# Patient Record
Sex: FEMALE | URBAN - METROPOLITAN AREA
[De-identification: names, ages, dates, MRNs, and addresses within clinical notes are randomized per-mention and may not be internally consistent; named-entity substitution may affect disease eponyms.]

---

## 2023-10-11 ENCOUNTER — OFFICE VISIT (OUTPATIENT)
Age: 58
End: 2023-10-11
Payer: COMMERCIAL

## 2023-10-11 VITALS
BODY MASS INDEX: 31.5 KG/M2 | HEIGHT: 63 IN | SYSTOLIC BLOOD PRESSURE: 158 MMHG | DIASTOLIC BLOOD PRESSURE: 99 MMHG | HEART RATE: 71 BPM | RESPIRATION RATE: 16 BRPM | OXYGEN SATURATION: 100 % | TEMPERATURE: 97.3 F | WEIGHT: 177.8 LBS

## 2023-10-11 DIAGNOSIS — K90.9 INTESTINAL MALABSORPTION, UNSPECIFIED TYPE: ICD-10-CM

## 2023-10-11 DIAGNOSIS — E66.09 CLASS 1 OBESITY DUE TO EXCESS CALORIES WITHOUT SERIOUS COMORBIDITY IN ADULT, UNSPECIFIED BMI: Primary | ICD-10-CM

## 2023-10-11 DIAGNOSIS — Z86.79 HISTORY OF WOLFF-PARKINSON-WHITE (WPW) SYNDROME: ICD-10-CM

## 2023-10-11 DIAGNOSIS — F41.9 ANXIETY: ICD-10-CM

## 2023-10-11 DIAGNOSIS — K21.9 GASTROESOPHAGEAL REFLUX DISEASE, UNSPECIFIED WHETHER ESOPHAGITIS PRESENT: ICD-10-CM

## 2023-10-11 DIAGNOSIS — Z87.891 SMOKING HISTORY: ICD-10-CM

## 2023-10-11 DIAGNOSIS — Z90.3 HISTORY OF SLEEVE GASTRECTOMY: ICD-10-CM

## 2023-10-11 DIAGNOSIS — K57.90 DIVERTICULOSIS: ICD-10-CM

## 2023-10-11 PROBLEM — E66.9 OBESITY: Status: ACTIVE | Noted: 2023-10-11

## 2023-10-11 PROCEDURE — 99205 OFFICE O/P NEW HI 60 MIN: CPT | Performed by: SPECIALIST

## 2023-10-11 RX ORDER — HYDROCODONE BITARTRATE AND ACETAMINOPHEN 5; 325 MG/1; MG/1
TABLET ORAL
COMMUNITY
Start: 2023-09-09 | End: 2023-10-11 | Stop reason: ALTCHOICE

## 2023-10-11 RX ORDER — CYCLOBENZAPRINE HCL 10 MG
10 TABLET ORAL
COMMUNITY
Start: 2023-09-20

## 2023-10-11 RX ORDER — DULOXETIN HYDROCHLORIDE 20 MG/1
20 CAPSULE, DELAYED RELEASE ORAL 2 TIMES DAILY
COMMUNITY
Start: 2023-10-06

## 2023-10-11 RX ORDER — VALACYCLOVIR HYDROCHLORIDE 500 MG/1
TABLET, FILM COATED ORAL
COMMUNITY
Start: 2023-07-27

## 2023-10-11 RX ORDER — HYDROCORTISONE 25 MG/G
CREAM TOPICAL
COMMUNITY
Start: 2023-10-08

## 2023-10-11 ASSESSMENT — PATIENT HEALTH QUESTIONNAIRE - PHQ9
SUM OF ALL RESPONSES TO PHQ QUESTIONS 1-9: 0
2. FEELING DOWN, DEPRESSED OR HOPELESS: 0
SUM OF ALL RESPONSES TO PHQ9 QUESTIONS 1 & 2: 0
1. LITTLE INTEREST OR PLEASURE IN DOING THINGS: 0
SUM OF ALL RESPONSES TO PHQ QUESTIONS 1-9: 0

## 2023-10-11 NOTE — PROGRESS NOTES
obtained to evaluate their post surgical anatomy. They understand that the majority of bariatric patients are not revision candidates due to appropriate post surgical anatomy that can not be improved upon. They understand also that if their initial procedure was performed via an open technique that this alone complicates their situation immensely. They understand, as I have explained today, that the adhesive disease associated with prior open procedures is at times a rate limiting factor. This precludes our ability to perform a revision procedure safely. The factors that contribute to this are increased risk such as age, health issues and increased risk from a procedural standpoint and have been discussed today. We will proceed with the UGI swallow study as described above. The patient understands all of the above and wishes to proceed with the study. 2.Nutrition-  I have discussed in detail the pitfalls in diet that have contributed to their weight regain and the importance of adhering to a lifelong regimen of dietary goals and proper eating habits. I have discussed the proper lifelong bariatric diet  in detail spending in excess of 20 minutes discussing this. We will schedule them for a dietary consultation with our nutritionist and urge them to continue on a regular follow-up schedule with her. 3.Maintenance vitamins- Today we have discussed the importance of vitamins as it pertains to their procedure and we will obtain appropriate lab to check all levels. They have been provided a handout regarding this today. Total time spent with the patient reviewing their complex history of bariatric surgery,diet, and plan is in excess of 60 minutes.       Secondary Diagnoses:         Signed By: SHALONDA Pedro     October 11, 2023

## 2023-10-18 ENCOUNTER — HOSPITAL ENCOUNTER (OUTPATIENT)
Facility: HOSPITAL | Age: 58
Discharge: HOME | End: 2023-10-21
Payer: COMMERCIAL

## 2023-10-18 ENCOUNTER — HOSPITAL ENCOUNTER (OUTPATIENT)
Facility: HOSPITAL | Age: 58
Setting detail: OUTPATIENT SURGERY
Discharge: HOME | End: 2023-10-21
Payer: COMMERCIAL

## 2023-10-18 VITALS
TEMPERATURE: 98.1 F | SYSTOLIC BLOOD PRESSURE: 137 MMHG | RESPIRATION RATE: 18 BRPM | DIASTOLIC BLOOD PRESSURE: 83 MMHG | HEIGHT: 63 IN | BODY MASS INDEX: 31.27 KG/M2 | HEART RATE: 65 BPM | WEIGHT: 176.5 LBS | OXYGEN SATURATION: 100 %

## 2023-10-18 DIAGNOSIS — E66.01 MORBID OBESITY (HCC): ICD-10-CM

## 2023-10-18 PROCEDURE — 2500000003 HC RX 250 WO HCPCS: Performed by: SPECIALIST

## 2023-10-18 PROCEDURE — 74240 X-RAY XM UPR GI TRC 1CNTRST: CPT

## 2023-10-18 PROCEDURE — 74240 X-RAY XM UPR GI TRC 1CNTRST: CPT | Performed by: SPECIALIST

## 2023-10-18 PROCEDURE — 74220 X-RAY XM ESOPHAGUS 1CNTRST: CPT

## 2023-10-18 RX ADMIN — BARIUM SULFATE 100 ML: 960 POWDER, FOR SUSPENSION ORAL at 13:54

## 2023-10-18 NOTE — PROCEDURES
[facility]    Upper GI Procedure Report      [facility]    Medical Record [id number]    1965    Date of Service - October 18, 2023    Pre-Op Diagnosis - patient is status post sleeve resection performed in [place] 9.5 years ago with complaint of weight regain and GERD. They now present for UGI to assess their post surgical anatomy. Post-Op Diagnosis -same    Procedure - UGI study with barium    Surgeon - Edouard Leon MD    Assistant - None    Complications - None    Specimens - None    Implants - None    Estimate Blood Loss - None    Statement of Medical Necessity - Need for radiologic evaluation prior to further management of their care. .    Procedure -the patient was brought to the fluoroscopy suite where they were given thin barium. On swallowing the barium the patient was noted to have normal peristalsis of their esophagus with progressive flow into the distal esophagus. Specific findings of the distal esophagus revealed that they did not have a hiatal hernia. Contrast flowed normally through the esophagus and into a relative properly sized sleeve stomach without reflux or obstruction but with a significant stricture at the proximal 1/4th aspect of the sleeve. The stomach filled in a timely manner and emptied into the duodenum without issue or hesitation. The anatomy was normal for the timeframe with no stricture or obstruction or any other abnormality. Given the findings of today's exam we will plan for EGD.     Edouard Leon MD

## 2023-10-18 NOTE — PROGRESS NOTES
THE [facility] UGI FOCUS NOTE      Date:  10/18/2023  Time:  1:50 PM    Patient:  Zari Cedeno  Procedure:  UGI      Patient Questions  Lap Band Adjustment Patient Questionnaire: If female, are you pregnant?  []Yes     [x]No  Tolerates thick liquids:  []Well   []1     []2     []3     []4     []5     [x]Poorly  Tolerates red meat:  []Well   []1     [x]2     []3     []4     []5     [] Poorly  Tolerates chicken:  []Well   []1     [x]2     []3     []4     []5     []Poorly  Tolerates fish:   []Well   []1     [x]2     []3     []4     []5     []Poorly  Hunger is:   []Well Controlled     []1     [x]2     []3     []4     []5      [] Poorly Controlled  Nightime regurgitation:  []Never     []1     []2     []3     []4     []5     [x]Frequently    Lap Band Info:  Band Type  []Realize     []Realize-C     []AP     []VG     []10cm     []Unknown  []Other      Comments:        Emily Maurer RN

## 2023-10-19 ENCOUNTER — TELEPHONE (OUTPATIENT)
Age: 58
End: 2023-10-19

## 2023-10-20 DIAGNOSIS — E53.9 VITAMIN B DEFICIENCY: ICD-10-CM

## 2023-10-20 DIAGNOSIS — K90.9 INTESTINAL MALABSORPTION, UNSPECIFIED TYPE: Primary | ICD-10-CM

## 2023-10-20 DIAGNOSIS — Z98.84 BARIATRIC SURGERY STATUS: ICD-10-CM

## 2023-11-02 ENCOUNTER — ANESTHESIA EVENT (OUTPATIENT)
Facility: HOSPITAL | Age: 58
End: 2023-11-02
Payer: COMMERCIAL

## 2023-11-02 ENCOUNTER — HOSPITAL ENCOUNTER (OUTPATIENT)
Facility: HOSPITAL | Age: 58
Discharge: HOME | End: 2023-11-02
Payer: COMMERCIAL

## 2023-11-02 DIAGNOSIS — Z98.84 BARIATRIC SURGERY STATUS: ICD-10-CM

## 2023-11-02 DIAGNOSIS — K90.9 INTESTINAL MALABSORPTION, UNSPECIFIED TYPE: ICD-10-CM

## 2023-11-02 LAB
ALBUMIN SERPL-MCNC: 3.7 G/DL (ref 3.4–5)
ALBUMIN/GLOB SERPL: 1.1 (ref 0.8–1.7)
ALP SERPL-CCNC: 103 U/L (ref 45–117)
ALT SERPL-CCNC: 29 U/L (ref 13–56)
AST SERPL-CCNC: 21 U/L (ref 10–38)
BILIRUB DIRECT SERPL-MCNC: <0.1 MG/DL (ref 0–0.2)
BILIRUB SERPL-MCNC: 0.4 MG/DL (ref 0.2–1)
GLOBULIN SER CALC-MCNC: 3.5 G/DL (ref 2–4)
PROT SERPL-MCNC: 7.2 G/DL (ref 6.4–8.2)

## 2023-11-02 PROCEDURE — 36415 COLL VENOUS BLD VENIPUNCTURE: CPT

## 2023-11-02 PROCEDURE — 80076 HEPATIC FUNCTION PANEL: CPT

## 2023-11-02 RX ORDER — DROPERIDOL 2.5 MG/ML
0.62 INJECTION, SOLUTION INTRAMUSCULAR; INTRAVENOUS
Status: CANCELLED | OUTPATIENT
Start: 2023-11-02 | End: 2023-11-03

## 2023-11-02 RX ORDER — ONDANSETRON 2 MG/ML
4 INJECTION INTRAMUSCULAR; INTRAVENOUS
Status: CANCELLED | OUTPATIENT
Start: 2023-11-02 | End: 2023-11-03

## 2023-11-02 RX ORDER — LABETALOL HYDROCHLORIDE 5 MG/ML
10 INJECTION, SOLUTION INTRAVENOUS
Status: CANCELLED | OUTPATIENT
Start: 2023-11-02

## 2023-11-02 RX ORDER — SODIUM CHLORIDE, SODIUM LACTATE, POTASSIUM CHLORIDE, CALCIUM CHLORIDE 600; 310; 30; 20 MG/100ML; MG/100ML; MG/100ML; MG/100ML
INJECTION, SOLUTION INTRAVENOUS CONTINUOUS
Status: CANCELLED | OUTPATIENT
Start: 2023-11-02

## 2023-11-02 RX ORDER — SODIUM CHLORIDE 0.9 % (FLUSH) 0.9 %
5-40 SYRINGE (ML) INJECTION EVERY 12 HOURS SCHEDULED
Status: CANCELLED | OUTPATIENT
Start: 2023-11-02

## 2023-11-02 RX ORDER — HYDRALAZINE HYDROCHLORIDE 20 MG/ML
10 INJECTION INTRAMUSCULAR; INTRAVENOUS
Status: CANCELLED | OUTPATIENT
Start: 2023-11-02

## 2023-11-02 RX ORDER — OXYCODONE HYDROCHLORIDE 5 MG/1
10 TABLET ORAL PRN
Status: CANCELLED | OUTPATIENT
Start: 2023-11-02 | End: 2023-11-02

## 2023-11-02 RX ORDER — HYDROMORPHONE HYDROCHLORIDE 1 MG/ML
0.5 INJECTION, SOLUTION INTRAMUSCULAR; INTRAVENOUS; SUBCUTANEOUS EVERY 5 MIN PRN
Status: CANCELLED | OUTPATIENT
Start: 2023-11-02

## 2023-11-02 RX ORDER — FENTANYL CITRATE 50 UG/ML
25 INJECTION, SOLUTION INTRAMUSCULAR; INTRAVENOUS EVERY 5 MIN PRN
Status: CANCELLED | OUTPATIENT
Start: 2023-11-02

## 2023-11-02 RX ORDER — SODIUM CHLORIDE 9 MG/ML
INJECTION, SOLUTION INTRAVENOUS PRN
Status: CANCELLED | OUTPATIENT
Start: 2023-11-02

## 2023-11-02 RX ORDER — MEPERIDINE HYDROCHLORIDE 50 MG/ML
12.5 INJECTION INTRAMUSCULAR; INTRAVENOUS; SUBCUTANEOUS ONCE
Status: CANCELLED | OUTPATIENT
Start: 2023-11-02 | End: 2023-11-02

## 2023-11-02 RX ORDER — SODIUM CHLORIDE 0.9 % (FLUSH) 0.9 %
5-40 SYRINGE (ML) INJECTION PRN
Status: CANCELLED | OUTPATIENT
Start: 2023-11-02

## 2023-11-02 RX ORDER — OXYCODONE HYDROCHLORIDE 5 MG/1
5 TABLET ORAL PRN
Status: CANCELLED | OUTPATIENT
Start: 2023-11-02 | End: 2023-11-02

## 2023-11-02 RX ORDER — DIPHENHYDRAMINE HYDROCHLORIDE 50 MG/ML
12.5 INJECTION INTRAMUSCULAR; INTRAVENOUS
Status: CANCELLED | OUTPATIENT
Start: 2023-11-02 | End: 2023-11-03

## 2023-11-02 RX ORDER — LORAZEPAM 2 MG/ML
0.5 INJECTION INTRAMUSCULAR ONCE
Status: CANCELLED | OUTPATIENT
Start: 2023-11-02 | End: 2023-11-02

## 2023-11-03 ENCOUNTER — HOSPITAL ENCOUNTER (OUTPATIENT)
Facility: HOSPITAL | Age: 58
Setting detail: OUTPATIENT SURGERY
Discharge: HOME | End: 2023-11-03
Attending: SPECIALIST | Admitting: SPECIALIST
Payer: COMMERCIAL

## 2023-11-03 ENCOUNTER — ANESTHESIA (OUTPATIENT)
Facility: HOSPITAL | Age: 58
End: 2023-11-03
Payer: COMMERCIAL

## 2023-11-03 VITALS
TEMPERATURE: 97.2 F | DIASTOLIC BLOOD PRESSURE: 82 MMHG | BODY MASS INDEX: 31.59 KG/M2 | OXYGEN SATURATION: 100 % | SYSTOLIC BLOOD PRESSURE: 120 MMHG | HEIGHT: 63 IN | HEART RATE: 75 BPM | RESPIRATION RATE: 16 BRPM | WEIGHT: 178.3 LBS

## 2023-11-03 PROCEDURE — 3600000002 HC SURGERY LEVEL 2 BASE: Performed by: SPECIALIST

## 2023-11-03 PROCEDURE — 2580000003 HC RX 258: Performed by: SPECIALIST

## 2023-11-03 PROCEDURE — 7100000010 HC PHASE II RECOVERY - FIRST 15 MIN: Performed by: SPECIALIST

## 2023-11-03 PROCEDURE — 88305 TISSUE EXAM BY PATHOLOGIST: CPT

## 2023-11-03 PROCEDURE — 3700000000 HC ANESTHESIA ATTENDED CARE: Performed by: SPECIALIST

## 2023-11-03 PROCEDURE — 7100000011 HC PHASE II RECOVERY - ADDTL 15 MIN: Performed by: SPECIALIST

## 2023-11-03 PROCEDURE — 43239 EGD BIOPSY SINGLE/MULTIPLE: CPT | Performed by: SPECIALIST

## 2023-11-03 PROCEDURE — 2709999900 HC NON-CHARGEABLE SUPPLY: Performed by: SPECIALIST

## 2023-11-03 PROCEDURE — 6360000002 HC RX W HCPCS: Performed by: NURSE ANESTHETIST, CERTIFIED REGISTERED

## 2023-11-03 RX ORDER — PROPOFOL 10 MG/ML
INJECTION, EMULSION INTRAVENOUS PRN
Status: DISCONTINUED | OUTPATIENT
Start: 2023-11-03 | End: 2023-11-03 | Stop reason: SDUPTHER

## 2023-11-03 RX ORDER — MIDAZOLAM HYDROCHLORIDE 1 MG/ML
INJECTION INTRAMUSCULAR; INTRAVENOUS PRN
Status: DISCONTINUED | OUTPATIENT
Start: 2023-11-03 | End: 2023-11-03 | Stop reason: SDUPTHER

## 2023-11-03 RX ORDER — ACETAMINOPHEN 500 MG
1000 TABLET ORAL EVERY 6 HOURS PRN
COMMUNITY

## 2023-11-03 RX ORDER — LIDOCAINE HYDROCHLORIDE 20 MG/ML
INJECTION, SOLUTION INTRAVENOUS PRN
Status: DISCONTINUED | OUTPATIENT
Start: 2023-11-03 | End: 2023-11-03 | Stop reason: SDUPTHER

## 2023-11-03 RX ORDER — SODIUM CHLORIDE, SODIUM LACTATE, POTASSIUM CHLORIDE, CALCIUM CHLORIDE 600; 310; 30; 20 MG/100ML; MG/100ML; MG/100ML; MG/100ML
INJECTION, SOLUTION INTRAVENOUS CONTINUOUS
Status: DISCONTINUED | OUTPATIENT
Start: 2023-11-03 | End: 2023-11-03 | Stop reason: HOSPADM

## 2023-11-03 RX ADMIN — PROPOFOL 80 MG: 10 INJECTION, EMULSION INTRAVENOUS at 15:57

## 2023-11-03 RX ADMIN — SODIUM CHLORIDE, POTASSIUM CHLORIDE, SODIUM LACTATE AND CALCIUM CHLORIDE: 600; 310; 30; 20 INJECTION, SOLUTION INTRAVENOUS at 12:49

## 2023-11-03 RX ADMIN — LIDOCAINE HYDROCHLORIDE 50 MG: 20 INJECTION, SOLUTION INTRAVENOUS at 15:57

## 2023-11-03 RX ADMIN — MIDAZOLAM 2 MG: 1 INJECTION INTRAMUSCULAR; INTRAVENOUS at 15:51

## 2023-11-03 ASSESSMENT — PAIN SCALES - GENERAL: PAINLEVEL_OUTOF10: 7

## 2023-11-03 ASSESSMENT — PAIN DESCRIPTION - LOCATION: LOCATION: ABDOMEN

## 2023-11-03 ASSESSMENT — PAIN DESCRIPTION - DESCRIPTORS: DESCRIPTORS: CRAMPING;SHARP

## 2023-11-03 NOTE — PROCEDURES
Esophagogastroduodenoscopy Procedure Note    Indications: sleeve resection performed in [place] 9.5 years ago with complaint of weight regain and GERD. A recent UGI showed Contrast flowed normally through the esophagus and into a relative properly sized sleeve stomach without reflux or obstruction but with a significant stricture at the proximal 1/4th aspect of the sleeve. The stomach filled in a timely manner and emptied into the duodenum without issue or hesitation. The anatomy was normal for the timeframe with no stricture or obstruction or any other abnormality. Given the findings of the exam we will plan for EGD. Anesthesia/Sedation: MAC anesthesia    Pre-Procedure Exam:  Airway: clear   Heart: normal S1and S2    Lungs: clear bilateral  Abdomen: soft, nontender, bowel sounds present and normal in all quadrants   Mental Status: awake, alert, and oriented to person, place, and time      Procedure in Detail:  Informed consent was obtained for the procedure, including conscious sedation. Risks of pancreatitis, infection, perforation, hemorrhage, adverse drug reaction, and aspiration were discussed. The patient was placed in the left lateral decubitus position. Based on the pre-procedure assessment, including review of the patient's medical history, medications, allergies, and review of systems, he had been deemed to be an appropriate candidate for moderate sedation; he was therefore sedated with the medications listed above. He was monitored continuously with electrocardiogram tracing, pulse oximetry, blood pressure monitoring, and direct observation. The [id number] gastroscope was inserted into the mouth and advanced under direct vision to the distal sleeve. A careful inspection was made as the gastroscope was withdrawn, including a retroflexed view of the proximal stomach; findings and interventions are described below. Appropriate photodocumentation was obtained.     Findings:   Oropharynx - normal mucosa without ulcer or obstruction of deformity   Esophagus - normal mucosa without rings, webs or other obstructions or abnormalities  Sleeve - findings consistent with recent UGI as she had dilated proximal sleeve due to a twisting effect 1/4 of the way down with notable gastritis. Once I was able to get past this area the remainder of the sleeve was normal.    Plan for possible conversion to a bypass         Therapies:    biopsy of stomach sleeve    Specimens: Specimens were collected and sent to pathology. Complications:   None; patient tolerated the procedure well. EBL:  None           Attending Attestation:  I performed the procedure. Recommendations:  - Await pathology. - Follow up with me. Signed by:  Alma Lovelace MD

## 2023-11-03 NOTE — ANESTHESIA POSTPROCEDURE EVALUATION
Department of Anesthesiology  Postprocedure Note    Patient: Nick Romo  MRN: [id number]  YOB: 1965  Date of evaluation: 11/3/2023      Procedure Summary     Date: 11/03/23 Room / Location: THE [facility] 04 / [facility] MAIN OR    Anesthesia Start: 5809 Anesthesia Stop: 1149    Procedure: EGD ESOPHAGOGASTRODUODENOSCOPY WITH  BIOPSY (Upper GI Region) Diagnosis:       Gastroesophageal reflux disease without esophagitis      Bariatric surgery status      (Gastroesophageal reflux disease without esophagitis [K21.9])      (Bariatric surgery status [Z98.84])    Surgeons: Nuria Gallegos MD Responsible Provider: Garry Alaniz MD    Anesthesia Type: MAC ASA Status: 3          Anesthesia Type: No value filed.     Siomara Phase I:      Siomara Phase II:        Anesthesia Post Evaluation    Patient location during evaluation: bedside  Patient participation: complete - patient participated  Level of consciousness: awake  Airway patency: patent  Nausea & Vomiting: no nausea and no vomiting  Complications: no  Cardiovascular status: hemodynamically stable  Respiratory status: acceptable  Hydration status: stable  Pain management: satisfactory to patient

## 2023-11-03 NOTE — H&P
EGD - History and Physical    Subjective: The patient is a 66 y.o. obese female who had a sleeve resection performed in [place] 9.5 years ago with complaint of weight regain and GERD. A recent UGI showed Contrast flowed normally through the esophagus and into a relative properly sized sleeve stomach without reflux or obstruction but with a significant stricture at the proximal 1/4th aspect of the sleeve. The stomach filled in a timely manner and emptied into the duodenum without issue or hesitation. The anatomy was normal for the timeframe with no stricture or obstruction or any other abnormality. Given the findings of the exam we will plan for EGD.     Patient Active Problem List    Diagnosis Date Noted    Obesity 10/11/2023    Intestinal malabsorption 10/11/2023    History of sleeve gastrectomy 10/11/2023    GERD (gastroesophageal reflux disease) 10/11/2023    Anxiety 10/11/2023    Diverticulosis 10/11/2023    History of Javon-Parkinson-White (WPW) syndrome 10/11/2023    Smoking history 10/11/2023      Past Surgical History:   Procedure Laterality Date     SECTION      x 3 (, , )    EYE SURGERY  2023    for glaucoma    HYSTERECTOMY (CERVIX STATUS UNKNOWN)      SLEEVE GASTRECTOMY      [place]    TONSILLECTOMY        Social History     Tobacco Use    Smoking status: Former     Types: Cigarettes     Quit date:      Years since quittin.8    Smokeless tobacco: Never   Substance Use Topics    Alcohol use: Not Currently      Family History   Problem Relation Age of Onset    Diabetes Mother     No Known Problems Father         father unknown to pt      Current Facility-Administered Medications   Medication Dose Route Frequency Provider Last Rate Last Admin    lactated ringers IV soln infusion   IntraVENous Continuous Aminata Carbajal  mL/hr at 23 1249 New Bag at 23 1249     Allergies   Allergen Reactions    Latex Rash     Skin irritation    Tamsulosin Hives

## 2023-11-03 NOTE — DISCHARGE INSTRUCTIONS
Jose M Cho  [id number]  1965    EGD DISCHARGE INSTRUCTIONS    Discomfort:  Sore throat- throat lozenges or warm salt water gargle  redness at IV site- apply warm compress to area; if redness or soreness persist- contact your physician  Gaseous discomfort- walking, belching will help relieve any discomfort  You should not operate a vehicle for 12 hours  You should note engage in an occupation involving machinery or appliances for rest of today  You may note drink alcoholic beverages for at least 12 hours  Avoid making any critical decisions for at least 24 hour  DIET  You may resume your regular diet - however -  remember your colon is empty and a heavy meal will produce gas. Avoid these foods:  vegetables, fried / greasy foods, carbonated drinks    ACTIVITY  You may resume your normal daily activities   Spend the remainder of the day resting -  avoid any strenuous activity. CALL M.D. ANY SIGN OF   Increasing pain, nausea, vomiting  Abdominal distension (swelling)  New increased bleeding (oral or rectal)  Fever (chills)  Pain in chest area  Bloody discharge from nose or mouth  Shortness of breath       Follow-up Instructions:   Dr Beba Casey will call you in one to two weeks. If you do not hear from him, please call his office [phone].                   Onsixto Cho  [id number]  1965        DISCHARGE SUMMARY from Nurse    The following personal items collected during your admission are returned to you:   Dental Appliance:    Vision:    Hearing Aid:    Jewelry:    Clothing:    Other Valuables:    Valuables sent to safe: Dose (mL/hr) Propofol : *80 mg    PATIENT INSTRUCTIONS:    Take Home Medications:      DISCHARGE SUMMARY from Nurse    PATIENT INSTRUCTIONS:    After general anesthesia or intravenous sedation, for 24 hours or while taking prescription Narcotics:  Limit your activities  Do not drive and operate hazardous machinery  Do not make important personal or business decisions  Do  not drink medications reviewed with the patient and caregiver and appropriate educational materials and side effects teaching were provided.   ___________________________________________________________________________________________________________________________________

## 2023-11-13 ENCOUNTER — OFFICE VISIT (OUTPATIENT)
Age: 58
End: 2023-11-13
Payer: COMMERCIAL

## 2023-11-13 VITALS
BODY MASS INDEX: 31.55 KG/M2 | OXYGEN SATURATION: 100 % | SYSTOLIC BLOOD PRESSURE: 115 MMHG | TEMPERATURE: 97.2 F | WEIGHT: 178.1 LBS | HEART RATE: 76 BPM | DIASTOLIC BLOOD PRESSURE: 78 MMHG | HEIGHT: 63 IN

## 2023-11-13 DIAGNOSIS — Z86.79 HISTORY OF WOLFF-PARKINSON-WHITE (WPW) SYNDROME: ICD-10-CM

## 2023-11-13 DIAGNOSIS — Z90.3 HISTORY OF SLEEVE GASTRECTOMY: ICD-10-CM

## 2023-11-13 DIAGNOSIS — R11.12 PROJECTILE VOMITING WITH NAUSEA: Primary | ICD-10-CM

## 2023-11-13 PROBLEM — R11.10 VOMITING: Status: ACTIVE | Noted: 2023-11-13

## 2023-11-13 PROCEDURE — 99214 OFFICE O/P EST MOD 30 MIN: CPT | Performed by: SPECIALIST

## 2023-11-13 RX ORDER — LIDOCAINE 50 MG/G
PATCH TOPICAL
COMMUNITY
Start: 2023-10-20

## 2023-11-13 ASSESSMENT — PATIENT HEALTH QUESTIONNAIRE - PHQ9
SUM OF ALL RESPONSES TO PHQ QUESTIONS 1-9: 0
2. FEELING DOWN, DEPRESSED OR HOPELESS: 0
1. LITTLE INTEREST OR PLEASURE IN DOING THINGS: 0
SUM OF ALL RESPONSES TO PHQ QUESTIONS 1-9: 0
SUM OF ALL RESPONSES TO PHQ9 QUESTIONS 1 & 2: 0

## 2023-11-13 NOTE — PROGRESS NOTES
retroflexed view of the proximal stomach; findings and interventions are described below. Appropriate photodocumentation was obtained. Findings:   Oropharynx - normal mucosa without ulcer or obstruction of deformity   Esophagus - normal mucosa without rings, webs or other obstructions or abnormalities  Sleeve - findings consistent with recent UGI as she had dilated proximal sleeve due to a twisting effect 1/4 of the way down with notable gastritis. Once I was able to get past this area the remainder of the sleeve was normal.     Plan for possible conversion to a bypass          Therapies:    biopsy of stomach sleeve     Specimens: Specimens were collected and sent to pathology. Complications:   None; patient tolerated the procedure well. EBL:  None           Attending Attestation:  I performed the procedure. Recommendations:  - Await pathology. - Follow up with me. Signed by: Alex Harrington MD                                                Assessment:      Morbid obesity status post sleeve  procedure approximately 9.5 years ago in [place] with complaint chronic dysphagia. Based on upper GI study the patient clearly has a proximal stricture and or twisting of her sleeve which we were able to reproduce both on upper GI study and endoscopy. We have discussed the possibility of a surgical stricture that was created at the time of her original surgery versus a twisting effect of the proximal sleeve. In any event my feeling is that she will require likely a conversion to the gastric bypass procedure to cure this issue. I given her a secondary option of simply performing a laparoscopy on her to free up the sleeve to see if we can tack it out laterally to improve her symptoms, however, I do not feel that this is likely going to work. She states that she would like to undergo only one operation as a curative means of her symptoms.            Plan:                Plan at this juncture is

## 2024-03-20 ENCOUNTER — HOSPITAL ENCOUNTER (OUTPATIENT)
Facility: HOSPITAL | Age: 59
Discharge: HOME | End: 2024-03-23

## 2024-03-20 VITALS — BODY MASS INDEX: 31.84 KG/M2 | HEIGHT: 63 IN | WEIGHT: 179.7 LBS

## 2024-03-20 NOTE — PROGRESS NOTES
Medical Weight Loss Multi-Disciplinary Program    Patient's Name: Smita Multani Age: 59 y.o.   YOB: 1965 Sex: female                     Session #1. Pt attended in-person class.  Weight obtained in office.    Date: 3/20/2024    Vitals:    03/20/24 1700   Weight: 81.5 kg (179 lb 11.2 oz)   Height: 1.6 m (5' 3\")      Body mass index is 31.83 kg/m².     Pounds Lost since last month: N/A   Pounds Gained since last month: N/A    Starting Weight: 177.8 lbs  Previous Month’s Weight: N/A  Overall Pounds Lost: 0 lbs  Overall Pounds Gained: 1.9 lbs      Class Guidelines    Guidelines are reviewed with patient at the start of every class.    1. Patient understands that weight loss trial classes must be consecutive.  Patient understands if they miss a class, it is their responsibility to contact me to reschedule class.  I will reach out to patient after their first no show.  2.  Patient understands the expectations that weight maintenance/weight loss is expected during the classes.  Failure to demonstrate changes may result in extension of weight loss trial, followed by returning to see the surgeon.  Patient understands that they CANNOT gain any weight during the weight loss trial.  Gaining weight will result in extension of weight loss trial.  3. Patient is also instructed to complete their labwork, psychological evaluation visit, and any other tests that the surgeon has used while they are working on their weight loss trial.  4.  Patient was instructed to bring their packet of nutrition education materials to every class and appointment.        Eating Habits and Behaviors    Today in class we reviewed the Key Diet Principles.  Patient was encouraged to consume 3 meals each day, and the timing the meals was reviewed.  Meal time behaviors that will help pt to be successful with their weight loss efforts were additionally reviewed.      We discussed the importance of drinking adequate amounts of fluids,

## 2024-03-28 DIAGNOSIS — K21.9 GASTROESOPHAGEAL REFLUX DISEASE, UNSPECIFIED WHETHER ESOPHAGITIS PRESENT: ICD-10-CM

## 2024-03-28 DIAGNOSIS — R11.12 PROJECTILE VOMITING WITH NAUSEA: ICD-10-CM

## 2024-03-28 DIAGNOSIS — Z01.812 PRE-OPERATIVE LABORATORY EXAMINATION: Primary | ICD-10-CM

## 2024-04-01 ENCOUNTER — HOSPITAL ENCOUNTER (OUTPATIENT)
Facility: HOSPITAL | Age: 59
Discharge: HOME | End: 2024-04-04

## 2024-04-01 ENCOUNTER — HOSPITAL ENCOUNTER (OUTPATIENT)
Facility: HOSPITAL | Age: 59
Discharge: HOME | End: 2024-04-04
Payer: MEDICAID

## 2024-04-01 ENCOUNTER — OFFICE VISIT (OUTPATIENT)
Age: 59
End: 2024-04-01
Payer: MEDICAID

## 2024-04-01 VITALS
HEIGHT: 63 IN | TEMPERATURE: 98.6 F | SYSTOLIC BLOOD PRESSURE: 130 MMHG | DIASTOLIC BLOOD PRESSURE: 80 MMHG | OXYGEN SATURATION: 100 % | HEART RATE: 86 BPM | BODY MASS INDEX: 32.18 KG/M2 | WEIGHT: 181.6 LBS

## 2024-04-01 DIAGNOSIS — R11.12 PROJECTILE VOMITING WITH NAUSEA: ICD-10-CM

## 2024-04-01 DIAGNOSIS — K21.9 GASTROESOPHAGEAL REFLUX DISEASE, UNSPECIFIED WHETHER ESOPHAGITIS PRESENT: ICD-10-CM

## 2024-04-01 DIAGNOSIS — Z86.79 HISTORY OF WOLFF-PARKINSON-WHITE (WPW) SYNDROME: ICD-10-CM

## 2024-04-01 DIAGNOSIS — Z90.3 HISTORY OF SLEEVE GASTRECTOMY: ICD-10-CM

## 2024-04-01 DIAGNOSIS — K90.9 INTESTINAL MALABSORPTION, UNSPECIFIED TYPE: ICD-10-CM

## 2024-04-01 DIAGNOSIS — R11.10 VOMITING, UNSPECIFIED VOMITING TYPE, UNSPECIFIED WHETHER NAUSEA PRESENT: Primary | ICD-10-CM

## 2024-04-01 DIAGNOSIS — Z01.812 PRE-OPERATIVE LABORATORY EXAMINATION: ICD-10-CM

## 2024-04-01 DIAGNOSIS — E66.09 CLASS 1 OBESITY DUE TO EXCESS CALORIES WITHOUT SERIOUS COMORBIDITY IN ADULT, UNSPECIFIED BMI: ICD-10-CM

## 2024-04-01 DIAGNOSIS — G89.18 POST-OP PAIN: Primary | ICD-10-CM

## 2024-04-01 LAB
ABO + RH BLD: NORMAL
ALBUMIN SERPL-MCNC: 3.7 G/DL (ref 3.4–5)
ALBUMIN/GLOB SERPL: 1.2 (ref 0.8–1.7)
ALP SERPL-CCNC: 123 U/L (ref 45–117)
ALT SERPL-CCNC: 29 U/L (ref 13–56)
ANION GAP SERPL CALC-SCNC: 4 MMOL/L (ref 3–18)
AST SERPL-CCNC: 20 U/L (ref 10–38)
BASOPHILS # BLD: 0.1 K/UL (ref 0–0.1)
BASOPHILS NFR BLD: 1 % (ref 0–2)
BILIRUB SERPL-MCNC: 0.2 MG/DL (ref 0.2–1)
BLOOD GROUP ANTIBODIES SERPL: NORMAL
BUN SERPL-MCNC: 13 MG/DL (ref 7–18)
BUN/CREAT SERPL: 21 (ref 12–20)
CALCIUM SERPL-MCNC: 9.2 MG/DL (ref 8.5–10.1)
CHLORIDE SERPL-SCNC: 106 MMOL/L (ref 100–111)
CO2 SERPL-SCNC: 29 MMOL/L (ref 21–32)
CREAT SERPL-MCNC: 0.63 MG/DL (ref 0.6–1.3)
DIFFERENTIAL METHOD BLD: ABNORMAL
EKG ATRIAL RATE: 70 BPM
EKG DIAGNOSIS: NORMAL
EKG P AXIS: 57 DEGREES
EKG P-R INTERVAL: 148 MS
EKG Q-T INTERVAL: 386 MS
EKG QRS DURATION: 84 MS
EKG QTC CALCULATION (BAZETT): 416 MS
EKG R AXIS: 59 DEGREES
EKG T AXIS: 33 DEGREES
EKG VENTRICULAR RATE: 70 BPM
EOSINOPHIL # BLD: 0.3 K/UL (ref 0–0.4)
EOSINOPHIL NFR BLD: 3 % (ref 0–5)
ERYTHROCYTE [DISTWIDTH] IN BLOOD BY AUTOMATED COUNT: 12.1 % (ref 11.6–14.5)
GLOBULIN SER CALC-MCNC: 3.1 G/DL (ref 2–4)
GLUCOSE SERPL-MCNC: 93 MG/DL (ref 74–99)
HCG SERPL QL: NEGATIVE
HCT VFR BLD AUTO: 41.6 % (ref 35–45)
HGB BLD-MCNC: 14.3 G/DL (ref 12–16)
IMM GRANULOCYTES # BLD AUTO: 0 K/UL (ref 0–0.04)
IMM GRANULOCYTES NFR BLD AUTO: 0 % (ref 0–0.5)
LYMPHOCYTES # BLD: 2.8 K/UL (ref 0.9–3.6)
LYMPHOCYTES NFR BLD: 34 % (ref 21–52)
MCH RBC QN AUTO: 29.9 PG (ref 24–34)
MCHC RBC AUTO-ENTMCNC: 34.4 G/DL (ref 31–37)
MCV RBC AUTO: 87 FL (ref 78–100)
MONOCYTES # BLD: 0.9 K/UL (ref 0.05–1.2)
MONOCYTES NFR BLD: 11 % (ref 3–10)
NEUTS SEG # BLD: 4.3 K/UL (ref 1.8–8)
NEUTS SEG NFR BLD: 52 % (ref 40–73)
NRBC # BLD: 0 K/UL (ref 0–0.01)
NRBC BLD-RTO: 0 PER 100 WBC
PLATELET # BLD AUTO: 276 K/UL (ref 135–420)
PMV BLD AUTO: 9 FL (ref 9.2–11.8)
POTASSIUM SERPL-SCNC: 4 MMOL/L (ref 3.5–5.5)
PROT SERPL-MCNC: 6.8 G/DL (ref 6.4–8.2)
RBC # BLD AUTO: 4.78 M/UL (ref 4.2–5.3)
SODIUM SERPL-SCNC: 139 MMOL/L (ref 136–145)
SPECIMEN EXP DATE BLD: NORMAL
WBC # BLD AUTO: 8.3 K/UL (ref 4.6–13.2)

## 2024-04-01 PROCEDURE — 80053 COMPREHEN METABOLIC PANEL: CPT

## 2024-04-01 PROCEDURE — 84703 CHORIONIC GONADOTROPIN ASSAY: CPT

## 2024-04-01 PROCEDURE — 36415 COLL VENOUS BLD VENIPUNCTURE: CPT

## 2024-04-01 PROCEDURE — 93005 ELECTROCARDIOGRAM TRACING: CPT

## 2024-04-01 PROCEDURE — 85025 COMPLETE CBC W/AUTO DIFF WBC: CPT

## 2024-04-01 PROCEDURE — 99215 OFFICE O/P EST HI 40 MIN: CPT | Performed by: SPECIALIST

## 2024-04-01 PROCEDURE — 86901 BLOOD TYPING SEROLOGIC RH(D): CPT

## 2024-04-01 PROCEDURE — 86900 BLOOD TYPING SEROLOGIC ABO: CPT

## 2024-04-01 PROCEDURE — 86850 RBC ANTIBODY SCREEN: CPT

## 2024-04-01 RX ORDER — ONDANSETRON 4 MG/1
4 TABLET, FILM COATED ORAL EVERY 8 HOURS PRN
Qty: 30 TABLET | Refills: 0 | Status: SHIPPED | OUTPATIENT
Start: 2024-04-01 | End: 2024-04-11

## 2024-04-01 RX ORDER — OXYCODONE HYDROCHLORIDE AND ACETAMINOPHEN 5; 325 MG/1; MG/1
1 TABLET ORAL EVERY 6 HOURS PRN
Qty: 28 TABLET | Refills: 0 | Status: SHIPPED | OUTPATIENT
Start: 2024-04-01 | End: 2024-04-08

## 2024-04-01 RX ORDER — ENOXAPARIN SODIUM 100 MG/ML
40 INJECTION SUBCUTANEOUS EVERY 12 HOURS
Qty: 14 EACH | Refills: 0 | Status: SHIPPED | OUTPATIENT
Start: 2024-04-01 | End: 2024-04-08

## 2024-04-01 NOTE — PROGRESS NOTES
distress and oriented to person, place, and time  Mental status - alert, oriented to person, place, and time, normal mood, behavior, speech, dress, motor activity, and thought processes  Eyes - pupils equal and reactive, extraocular eye movements intact, sclera anicteric, left eye normal, right eye normal  Ears - right ear normal, left ear normal  Neck - supple, no significant adenopathy  Chest - clear to auscultation, no wheezes, rales or rhonchi, symmetric air entry  Heart - normal rate and regular rhythm  Abdomen - soft, nontender, nondistended, no masses or organomegaly  Back exam - full range of motion, no tenderness, palpable spasm or pain on motion  Neurological - alert, oriented, normal speech, no focal findings or movement disorder noted  Musculoskeletal - no joint tenderness, deformity or swelling  Extremities - peripheral pulses normal, no pedal edema, no clubbing or cyanosis    Labs :     Lab Results   Component Value Date/Time    WBC 8.3 04/01/2024 12:32 PM    HGB 14.3 04/01/2024 12:32 PM    HCT 41.6 04/01/2024 12:32 PM     04/01/2024 12:32 PM    MCV 87.0 04/01/2024 12:32 PM     Lab Results   Component Value Date/Time     04/01/2024 12:32 PM    K 4.0 04/01/2024 12:32 PM     04/01/2024 12:32 PM    CO2 29 04/01/2024 12:32 PM    BUN 13 04/01/2024 12:32 PM    GLOB 3.1 04/01/2024 12:32 PM    ALT 29 04/01/2024 12:32 PM     No results found for: \"IRON\", \"TIBC\", \"IBCT\", \"FERR\"  No results found for: \"FOL\", \"RBCF\"  No results found for: \"VITD3\", \"VD3RIA\"            Cardiac / Pulmonary Evaluation:         UGI Results:        Srinath Barreto MD  Physician  General Surgery     Procedures     Signed     Date of Service: 10/18/2023  4:00 PM     Signed         [facility]     Upper GI Procedure Report        Smita Multani     Medical Record Number:[id number]     1965     Date of Service - October 18, 2023     Pre-Op Diagnosis - patient is status post sleeve resection performed in

## 2024-04-01 NOTE — PROGRESS NOTES
CLINICAL NUTRITION PRE-OPERATIVE EDUCATION    Patient's Name: Smita Multani Age: 59 y.o.   YOB: 1965 Sex: female       Education & Materials Provided - Post-op Binder to include:  Liquid Diet Shopping List   Supplemental Resource Guide: MVI, Vitamin B12, Calcium Citrate, Vitamin D, Vitamin B50, and Iron recommendations  Protein Supplement Information   Fluid Requirements/No Straws  No Caffeine or Carbonation   No Alcohol                               No Snacks or No Concentrated Sweets                                   Exercise Guidelines   Key Diet Principles                            Addressed Current Habits/Changes to Make   Patient was instructed on clear liquid diet guidelines to follow for one (1) day prior to surgery.  Patient has been educated on the liquid diet to begin day 2 post-op and continue for 2 weeks  Patient understands the timeline for diet progression and the need to remain on full liquid diet until advanced by provider and dietitian.               Summary:  Patient has completed the required visits with the Registered Dietitian.  During these nutrition visits, we focused on dietary changes, behavior modifications, and the importance of establishing an exercise routine.  The pre-op diet protocol that patient was prescribed emphasized low carbohydrate intake (less than 50 grams per day) and 60-80 grams (g) of protein per day.     At today's session, patient was educated on the post-op diet and vitamin/mineral protocols.  Patient understands the importance of keeping total fat and sugar intake to less than 3g per serving.  Patient is aware of the the timeline for the different stages of the post-op diet and is aware that they will be on a modified full liquid diet for 2 weeks post-op.  Patient understands that the body needs ~60-70 g/d protein.  During the liquid diet phase, patient will need a minimum of 60 g/d protein from supplemental shakes.  Once eating soft protein-rich foods,

## 2024-04-01 NOTE — H&P (VIEW-ONLY)
the procedure.      Recommendations:  - Await pathology.  - Follow up with me.     Signed by: Srinath Barreto MD    Assessment:     Morbid obesity with associated comorbidity    Plan:     laparoscopic gastric bypass surgery as a conversion from sleeve gastrectomy    This is a 59 y.o. female with a BMI of Body mass index is 32.17 kg/m². and the weight-related co-morbidties as noted above. Smita meets the NIH criteria for bariatric surgery based upon the BMI of Body mass index is 32.17 kg/m². and   multiple weight-related co-morbidties. Smita has elected laparoscopic gastric bypass as her intervention of choice for treatment of morbid obestiy through surgical means secondary to its   uniform results,  profound baseline suppression of hunger and pace at which weight is lost.    In the office today, following Smita's history and physical examination, a 40 minute discussion regarding the anatomic alterations for the laparoscopic gastric bypass  was undertaken. The dietary   expectations and the patient  dependent factors for success were thoroughly discussed, to include the need for interval follow-up and long-term dietary changes associated with success. The possible short   and long term  complications of the gastric bypass were also discussed, to include but not limited to;death, DVT/PE, staple line leak, bleeding, stricture formation, infection,internal hernia  and pouch dilation.    Specific weight related outcomes for success were also discussed with an emphasis on careful and close follow-up with the first year and Dietary behavior modification over the first years as baseline cyclical   hunger returns  The patient expressed an understanding of the above factors, and her questions were answered in their entirety.    In addition, the patient attended a 1.5 hour power point seminar or online seminar regarding obesity, surgical weight loss including, adjustable gastric band, gastric bypass, and sleeve gastrectomy.   This discussion contrasted   the different surgical techniques, mechanisms of actions and expected outcomes, and surgical and medical risks associated with each procedure.  During the in office seminar, there was a long question and answer   session where each questions was answered until there were no additional questions.     Today, the patient had all of her questions answered and the decision was made today that the patient's preoperative evaluation is acceptable for them  to proceed with bariatric surgery  choosing  gastric bypass as her surgical option.         Secondary Diagnoses:     DVT / Pulmonary Embolus Risk - The patient is at a higher risk for post operative DVT / pulmonary embolus secondary to their morbid obese status, relative sedentary lifestyle, and impending general anesthetic.  We will plan to use anticoagulation therapy pre and post operative as well as compression leg devices and encourage ambulation once on the hospital nursing floor.  The need for possible at home anticoagulation therapy has also been discussed and any decision on this matter will be made during post operative evaluations.  Signs and symptoms of DVT / PE have been discussed with the patient and they have been instructed to call the office if any these occur in the \"at home\" post op phase.       Signed By: Srinath Barreto MD     April 1, 2024         Time spent was 45 minutes

## 2024-04-01 NOTE — PATIENT INSTRUCTIONS
Preparation for Surgery  Refer to your book for specific instructions    Stop taking all aspirin products, ibuprofen products, non-steroidal medications, blood thinners,  and herbal supplements as outlined in your book.     Absolutely no smoking.     If diabetic, monitor blood sugars regularly and alert the office of blood sugars over 200.    Have a supply of protein product and liquid diet items for your first two weeks as outlined in your book.    The day before your surgery is scheduled:    Gastric Bypass and Sleeve:  Clear liquids and Protein Shakes    Gastric Band:   Eat lightly.  No snacking.     Drink lots of water         6.  Get prepared to meet a new you!

## 2024-04-03 ENCOUNTER — TELEPHONE (OUTPATIENT)
Age: 59
End: 2024-04-03

## 2024-04-03 NOTE — TELEPHONE ENCOUNTER
04/01/24:  Patient attended bariatric surgery pre-operative education class today and watched the medical presentation via video.  Patient was given a bariatric binder of resources; form acknowledging receipt of said book was completed.      04/03/24:  RN left a VM message to follow-up as to pre-op class on Monday and reminded them of their diet the day before surgery.  This included the following: Clear liquid diet only which includes sugar-free Jell-O/popsicles, broth, decaf coffee/tea (no creamer), protein shakes (only exception to the diet), diet juice (no orange or tomato), water, Crystal Light.  Patient was also reminded to not have anything to eat nor drink after midnight, with the exception of a small amount of water to take any medications they may have been instructed to take the morning of surgery.  Patient was referred to page 17 of their bariatric booklet for further reference.  Patient was encouraged to contact the office with further questions.

## 2024-04-08 ENCOUNTER — ANESTHESIA EVENT (OUTPATIENT)
Facility: HOSPITAL | Age: 59
End: 2024-04-08
Payer: MEDICAID

## 2024-04-09 ENCOUNTER — ANESTHESIA (OUTPATIENT)
Facility: HOSPITAL | Age: 59
End: 2024-04-09
Payer: MEDICAID

## 2024-04-09 ENCOUNTER — HOSPITAL ENCOUNTER (INPATIENT)
Facility: HOSPITAL | Age: 59
LOS: 4 days | Discharge: HOME | End: 2024-04-13
Attending: SPECIALIST | Admitting: SPECIALIST
Payer: MEDICAID

## 2024-04-09 PROBLEM — K31.2 HOURGLASS STRICTURE OF STOMACH: Status: ACTIVE | Noted: 2024-04-09

## 2024-04-09 PROBLEM — K21.9 CHRONIC GERD: Status: ACTIVE | Noted: 2024-04-09

## 2024-04-09 LAB
ABO + RH BLD: NORMAL
BASOPHILS # BLD: 0.1 K/UL (ref 0–0.1)
BASOPHILS NFR BLD: 0 % (ref 0–2)
BLOOD GROUP ANTIBODIES SERPL: NORMAL
DIFFERENTIAL METHOD BLD: ABNORMAL
EOSINOPHIL # BLD: 0 K/UL (ref 0–0.4)
EOSINOPHIL NFR BLD: 0 % (ref 0–5)
ERYTHROCYTE [DISTWIDTH] IN BLOOD BY AUTOMATED COUNT: 12.4 % (ref 11.6–14.5)
HCT VFR BLD AUTO: 33.8 % (ref 35–45)
HGB BLD-MCNC: 11.4 G/DL (ref 12–16)
HISTORY CHECK: NORMAL
IMM GRANULOCYTES # BLD AUTO: 0.1 K/UL (ref 0–0.04)
IMM GRANULOCYTES NFR BLD AUTO: 0 % (ref 0–0.5)
LYMPHOCYTES # BLD: 1 K/UL (ref 0.9–3.6)
LYMPHOCYTES NFR BLD: 6 % (ref 21–52)
MCH RBC QN AUTO: 30 PG (ref 24–34)
MCHC RBC AUTO-ENTMCNC: 33.7 G/DL (ref 31–37)
MCV RBC AUTO: 88.9 FL (ref 78–100)
MONOCYTES # BLD: 1.1 K/UL (ref 0.05–1.2)
MONOCYTES NFR BLD: 6 % (ref 3–10)
NEUTS SEG # BLD: 16.4 K/UL (ref 1.8–8)
NEUTS SEG NFR BLD: 88 % (ref 40–73)
NRBC # BLD: 0 K/UL (ref 0–0.01)
NRBC BLD-RTO: 0 PER 100 WBC
PLATELET # BLD AUTO: 344 K/UL (ref 135–420)
PMV BLD AUTO: 8.9 FL (ref 9.2–11.8)
RBC # BLD AUTO: 3.8 M/UL (ref 4.2–5.3)
SPECIMEN EXP DATE BLD: NORMAL
WBC # BLD AUTO: 18.6 K/UL (ref 4.6–13.2)

## 2024-04-09 PROCEDURE — 2720000010 HC SURG SUPPLY STERILE: Performed by: SPECIALIST

## 2024-04-09 PROCEDURE — 86923 COMPATIBILITY TEST ELECTRIC: CPT

## 2024-04-09 PROCEDURE — 2580000003 HC RX 258: Performed by: SPECIALIST

## 2024-04-09 PROCEDURE — 2500000003 HC RX 250 WO HCPCS: Performed by: NURSE ANESTHETIST, CERTIFIED REGISTERED

## 2024-04-09 PROCEDURE — 0DNL4ZZ RELEASE TRANSVERSE COLON, PERCUTANEOUS ENDOSCOPIC APPROACH: ICD-10-PCS | Performed by: SPECIALIST

## 2024-04-09 PROCEDURE — 88307 TISSUE EXAM BY PATHOLOGIST: CPT

## 2024-04-09 PROCEDURE — 88313 SPECIAL STAINS GROUP 2: CPT

## 2024-04-09 PROCEDURE — 2709999900 HC NON-CHARGEABLE SUPPLY: Performed by: SPECIALIST

## 2024-04-09 PROCEDURE — 3600000005 HC SURGERY LEVEL 5 BASE: Performed by: SPECIALIST

## 2024-04-09 PROCEDURE — 36415 COLL VENOUS BLD VENIPUNCTURE: CPT

## 2024-04-09 PROCEDURE — 3600000015 HC SURGERY LEVEL 5 ADDTL 15MIN: Performed by: SPECIALIST

## 2024-04-09 PROCEDURE — 6360000002 HC RX W HCPCS: Performed by: NURSE ANESTHETIST, CERTIFIED REGISTERED

## 2024-04-09 PROCEDURE — 6360000002 HC RX W HCPCS: Performed by: SPECIALIST

## 2024-04-09 PROCEDURE — 2700000000 HC OXYGEN THERAPY PER DAY

## 2024-04-09 PROCEDURE — 85025 COMPLETE CBC W/AUTO DIFF WBC: CPT

## 2024-04-09 PROCEDURE — A4216 STERILE WATER/SALINE, 10 ML: HCPCS | Performed by: SPECIALIST

## 2024-04-09 PROCEDURE — 7100000000 HC PACU RECOVERY - FIRST 15 MIN: Performed by: SPECIALIST

## 2024-04-09 PROCEDURE — 0DB64Z3 EXCISION OF STOMACH, PERCUTANEOUS ENDOSCOPIC APPROACH, VERTICAL: ICD-10-PCS | Performed by: SPECIALIST

## 2024-04-09 PROCEDURE — 6370000000 HC RX 637 (ALT 250 FOR IP): Performed by: SPECIALIST

## 2024-04-09 PROCEDURE — 3700000001 HC ADD 15 MINUTES (ANESTHESIA): Performed by: SPECIALIST

## 2024-04-09 PROCEDURE — 0FB04ZX EXCISION OF LIVER, PERCUTANEOUS ENDOSCOPIC APPROACH, DIAGNOSTIC: ICD-10-PCS | Performed by: SPECIALIST

## 2024-04-09 PROCEDURE — 86900 BLOOD TYPING SEROLOGIC ABO: CPT

## 2024-04-09 PROCEDURE — 1100000000 HC RM PRIVATE

## 2024-04-09 PROCEDURE — 0D164ZA BYPASS STOMACH TO JEJUNUM, PERCUTANEOUS ENDOSCOPIC APPROACH: ICD-10-PCS | Performed by: SPECIALIST

## 2024-04-09 PROCEDURE — 2500000003 HC RX 250 WO HCPCS: Performed by: SPECIALIST

## 2024-04-09 PROCEDURE — 0DJ08ZZ INSPECTION OF UPPER INTESTINAL TRACT, VIA NATURAL OR ARTIFICIAL OPENING ENDOSCOPIC: ICD-10-PCS | Performed by: SPECIALIST

## 2024-04-09 PROCEDURE — 3700000000 HC ANESTHESIA ATTENDED CARE: Performed by: SPECIALIST

## 2024-04-09 PROCEDURE — C1713 ANCHOR/SCREW BN/BN,TIS/BN: HCPCS | Performed by: SPECIALIST

## 2024-04-09 PROCEDURE — 88342 IMHCHEM/IMCYTCHM 1ST ANTB: CPT

## 2024-04-09 PROCEDURE — 7100000001 HC PACU RECOVERY - ADDTL 15 MIN: Performed by: SPECIALIST

## 2024-04-09 PROCEDURE — 86850 RBC ANTIBODY SCREEN: CPT

## 2024-04-09 PROCEDURE — 86901 BLOOD TYPING SEROLOGIC RH(D): CPT

## 2024-04-09 RX ORDER — SIMETHICONE 80 MG
80 TABLET,CHEWABLE ORAL EVERY 6 HOURS PRN
Status: DISCONTINUED | OUTPATIENT
Start: 2024-04-09 | End: 2024-04-13 | Stop reason: HOSPADM

## 2024-04-09 RX ORDER — ONDANSETRON 2 MG/ML
4 INJECTION INTRAMUSCULAR; INTRAVENOUS EVERY 6 HOURS PRN
Status: DISCONTINUED | OUTPATIENT
Start: 2024-04-09 | End: 2024-04-13 | Stop reason: HOSPADM

## 2024-04-09 RX ORDER — NALOXONE HYDROCHLORIDE 0.4 MG/ML
INJECTION, SOLUTION INTRAMUSCULAR; INTRAVENOUS; SUBCUTANEOUS PRN
Status: DISCONTINUED | OUTPATIENT
Start: 2024-04-09 | End: 2024-04-09 | Stop reason: HOSPADM

## 2024-04-09 RX ORDER — BUPIVACAINE HYDROCHLORIDE 2.5 MG/ML
INJECTION, SOLUTION EPIDURAL; INFILTRATION; INTRACAUDAL PRN
Status: DISCONTINUED | OUTPATIENT
Start: 2024-04-09 | End: 2024-04-09 | Stop reason: ALTCHOICE

## 2024-04-09 RX ORDER — METOCLOPRAMIDE HYDROCHLORIDE 5 MG/ML
INJECTION INTRAMUSCULAR; INTRAVENOUS PRN
Status: DISCONTINUED | OUTPATIENT
Start: 2024-04-09 | End: 2024-04-09 | Stop reason: SDUPTHER

## 2024-04-09 RX ORDER — MORPHINE SULFATE 10 MG/ML
6 INJECTION, SOLUTION INTRAMUSCULAR; INTRAVENOUS
Status: DISCONTINUED | OUTPATIENT
Start: 2024-04-09 | End: 2024-04-10

## 2024-04-09 RX ORDER — FENTANYL CITRATE 50 UG/ML
INJECTION, SOLUTION INTRAMUSCULAR; INTRAVENOUS PRN
Status: DISCONTINUED | OUTPATIENT
Start: 2024-04-09 | End: 2024-04-09 | Stop reason: SDUPTHER

## 2024-04-09 RX ORDER — DROPERIDOL 2.5 MG/ML
0.62 INJECTION, SOLUTION INTRAMUSCULAR; INTRAVENOUS
Status: DISCONTINUED | OUTPATIENT
Start: 2024-04-09 | End: 2024-04-09 | Stop reason: HOSPADM

## 2024-04-09 RX ORDER — SODIUM CHLORIDE 9 MG/ML
INJECTION, SOLUTION INTRAVENOUS PRN
Status: DISCONTINUED | OUTPATIENT
Start: 2024-04-09 | End: 2024-04-09 | Stop reason: HOSPADM

## 2024-04-09 RX ORDER — SODIUM CHLORIDE 9 MG/ML
INJECTION, SOLUTION INTRAVENOUS PRN
Status: DISCONTINUED | OUTPATIENT
Start: 2024-04-09 | End: 2024-04-13 | Stop reason: HOSPADM

## 2024-04-09 RX ORDER — ENOXAPARIN SODIUM 100 MG/ML
40 INJECTION SUBCUTANEOUS EVERY 12 HOURS SCHEDULED
Status: DISCONTINUED | OUTPATIENT
Start: 2024-04-09 | End: 2024-04-10

## 2024-04-09 RX ORDER — OXYCODONE HYDROCHLORIDE 5 MG/1
5 TABLET ORAL
Status: DISCONTINUED | OUTPATIENT
Start: 2024-04-09 | End: 2024-04-09 | Stop reason: HOSPADM

## 2024-04-09 RX ORDER — ROCURONIUM BROMIDE 10 MG/ML
INJECTION, SOLUTION INTRAVENOUS PRN
Status: DISCONTINUED | OUTPATIENT
Start: 2024-04-09 | End: 2024-04-09 | Stop reason: SDUPTHER

## 2024-04-09 RX ORDER — ENOXAPARIN SODIUM 100 MG/ML
40 INJECTION SUBCUTANEOUS ONCE
Status: COMPLETED | OUTPATIENT
Start: 2024-04-09 | End: 2024-04-09

## 2024-04-09 RX ORDER — SODIUM CHLORIDE, SODIUM LACTATE, POTASSIUM CHLORIDE, CALCIUM CHLORIDE 600; 310; 30; 20 MG/100ML; MG/100ML; MG/100ML; MG/100ML
INJECTION, SOLUTION INTRAVENOUS CONTINUOUS
Status: DISCONTINUED | OUTPATIENT
Start: 2024-04-09 | End: 2024-04-09

## 2024-04-09 RX ORDER — DIPHENHYDRAMINE HYDROCHLORIDE 50 MG/ML
12.5 INJECTION INTRAMUSCULAR; INTRAVENOUS
Status: DISCONTINUED | OUTPATIENT
Start: 2024-04-09 | End: 2024-04-09 | Stop reason: HOSPADM

## 2024-04-09 RX ORDER — SODIUM CHLORIDE 0.9 % (FLUSH) 0.9 %
5-40 SYRINGE (ML) INJECTION PRN
Status: DISCONTINUED | OUTPATIENT
Start: 2024-04-09 | End: 2024-04-09 | Stop reason: HOSPADM

## 2024-04-09 RX ORDER — ACETAMINOPHEN 500 MG
1000 TABLET ORAL ONCE
Status: COMPLETED | OUTPATIENT
Start: 2024-04-09 | End: 2024-04-09

## 2024-04-09 RX ORDER — SODIUM CHLORIDE 0.9 % (FLUSH) 0.9 %
5-40 SYRINGE (ML) INJECTION EVERY 12 HOURS SCHEDULED
Status: DISCONTINUED | OUTPATIENT
Start: 2024-04-09 | End: 2024-04-09 | Stop reason: HOSPADM

## 2024-04-09 RX ORDER — SODIUM CHLORIDE 0.9 % (FLUSH) 0.9 %
5-40 SYRINGE (ML) INJECTION EVERY 12 HOURS SCHEDULED
Status: DISCONTINUED | OUTPATIENT
Start: 2024-04-09 | End: 2024-04-13 | Stop reason: HOSPADM

## 2024-04-09 RX ORDER — SODIUM CHLORIDE, SODIUM LACTATE, POTASSIUM CHLORIDE, CALCIUM CHLORIDE 600; 310; 30; 20 MG/100ML; MG/100ML; MG/100ML; MG/100ML
INJECTION, SOLUTION INTRAVENOUS CONTINUOUS
Status: DISCONTINUED | OUTPATIENT
Start: 2024-04-09 | End: 2024-04-09 | Stop reason: HOSPADM

## 2024-04-09 RX ORDER — ONDANSETRON 2 MG/ML
4 INJECTION INTRAMUSCULAR; INTRAVENOUS
Status: DISCONTINUED | OUTPATIENT
Start: 2024-04-09 | End: 2024-04-09 | Stop reason: HOSPADM

## 2024-04-09 RX ORDER — SUCCINYLCHOLINE/SOD CL,ISO/PF 100 MG/5ML
SYRINGE (ML) INTRAVENOUS PRN
Status: DISCONTINUED | OUTPATIENT
Start: 2024-04-09 | End: 2024-04-09 | Stop reason: SDUPTHER

## 2024-04-09 RX ORDER — LABETALOL HYDROCHLORIDE 5 MG/ML
10 INJECTION, SOLUTION INTRAVENOUS
Status: DISCONTINUED | OUTPATIENT
Start: 2024-04-09 | End: 2024-04-09 | Stop reason: HOSPADM

## 2024-04-09 RX ORDER — PROPOFOL 10 MG/ML
INJECTION, EMULSION INTRAVENOUS PRN
Status: DISCONTINUED | OUTPATIENT
Start: 2024-04-09 | End: 2024-04-09 | Stop reason: SDUPTHER

## 2024-04-09 RX ORDER — HYDROMORPHONE HYDROCHLORIDE 1 MG/ML
INJECTION, SOLUTION INTRAMUSCULAR; INTRAVENOUS; SUBCUTANEOUS PRN
Status: DISCONTINUED | OUTPATIENT
Start: 2024-04-09 | End: 2024-04-09 | Stop reason: SDUPTHER

## 2024-04-09 RX ORDER — HYDROMORPHONE HYDROCHLORIDE 1 MG/ML
0.25 INJECTION, SOLUTION INTRAMUSCULAR; INTRAVENOUS; SUBCUTANEOUS EVERY 5 MIN PRN
Status: DISCONTINUED | OUTPATIENT
Start: 2024-04-09 | End: 2024-04-09 | Stop reason: HOSPADM

## 2024-04-09 RX ORDER — SODIUM CHLORIDE, SODIUM LACTATE, POTASSIUM CHLORIDE, CALCIUM CHLORIDE 600; 310; 30; 20 MG/100ML; MG/100ML; MG/100ML; MG/100ML
INJECTION, SOLUTION INTRAVENOUS CONTINUOUS
Status: DISCONTINUED | OUTPATIENT
Start: 2024-04-09 | End: 2024-04-11

## 2024-04-09 RX ORDER — ONDANSETRON 2 MG/ML
INJECTION INTRAMUSCULAR; INTRAVENOUS PRN
Status: DISCONTINUED | OUTPATIENT
Start: 2024-04-09 | End: 2024-04-09 | Stop reason: SDUPTHER

## 2024-04-09 RX ORDER — LIDOCAINE HYDROCHLORIDE 20 MG/ML
INJECTION, SOLUTION EPIDURAL; INFILTRATION; INTRACAUDAL; PERINEURAL PRN
Status: DISCONTINUED | OUTPATIENT
Start: 2024-04-09 | End: 2024-04-09 | Stop reason: SDUPTHER

## 2024-04-09 RX ORDER — SODIUM CHLORIDE 0.9 % (FLUSH) 0.9 %
5-40 SYRINGE (ML) INJECTION PRN
Status: DISCONTINUED | OUTPATIENT
Start: 2024-04-09 | End: 2024-04-13 | Stop reason: HOSPADM

## 2024-04-09 RX ORDER — ONDANSETRON 4 MG/1
4 TABLET, ORALLY DISINTEGRATING ORAL EVERY 8 HOURS PRN
Status: DISCONTINUED | OUTPATIENT
Start: 2024-04-09 | End: 2024-04-13 | Stop reason: HOSPADM

## 2024-04-09 RX ORDER — FENTANYL CITRATE 50 UG/ML
25 INJECTION, SOLUTION INTRAMUSCULAR; INTRAVENOUS EVERY 5 MIN PRN
Status: COMPLETED | OUTPATIENT
Start: 2024-04-09 | End: 2024-04-09

## 2024-04-09 RX ORDER — SODIUM CHLORIDE, SODIUM LACTATE, POTASSIUM CHLORIDE, AND CALCIUM CHLORIDE .6; .31; .03; .02 G/100ML; G/100ML; G/100ML; G/100ML
500 INJECTION, SOLUTION INTRAVENOUS ONCE
Status: COMPLETED | OUTPATIENT
Start: 2024-04-10 | End: 2024-04-10

## 2024-04-09 RX ORDER — SODIUM CHLORIDE, SODIUM LACTATE, POTASSIUM CHLORIDE, AND CALCIUM CHLORIDE .6; .31; .03; .02 G/100ML; G/100ML; G/100ML; G/100ML
1000 INJECTION, SOLUTION INTRAVENOUS ONCE
Status: COMPLETED | OUTPATIENT
Start: 2024-04-09 | End: 2024-04-10

## 2024-04-09 RX ORDER — EPHEDRINE SULFATE/0.9% NACL/PF 50 MG/5 ML
SYRINGE (ML) INTRAVENOUS PRN
Status: DISCONTINUED | OUTPATIENT
Start: 2024-04-09 | End: 2024-04-09 | Stop reason: SDUPTHER

## 2024-04-09 RX ORDER — MIDAZOLAM HYDROCHLORIDE 1 MG/ML
INJECTION INTRAMUSCULAR; INTRAVENOUS PRN
Status: DISCONTINUED | OUTPATIENT
Start: 2024-04-09 | End: 2024-04-09 | Stop reason: SDUPTHER

## 2024-04-09 RX ORDER — METOCLOPRAMIDE HYDROCHLORIDE 5 MG/ML
10 INJECTION INTRAMUSCULAR; INTRAVENOUS EVERY 6 HOURS
Status: DISCONTINUED | OUTPATIENT
Start: 2024-04-09 | End: 2024-04-13

## 2024-04-09 RX ORDER — PHENYLEPHRINE HYDROCHLORIDE 10 MG/ML
INJECTION INTRAVENOUS PRN
Status: DISCONTINUED | OUTPATIENT
Start: 2024-04-09 | End: 2024-04-09 | Stop reason: SDUPTHER

## 2024-04-09 RX ADMIN — SODIUM CHLORIDE, PRESERVATIVE FREE 10 ML: 5 INJECTION INTRAVENOUS at 20:43

## 2024-04-09 RX ADMIN — FENTANYL CITRATE 25 MCG: 50 INJECTION INTRAMUSCULAR; INTRAVENOUS at 15:03

## 2024-04-09 RX ADMIN — SODIUM CHLORIDE, SODIUM LACTATE, POTASSIUM CHLORIDE, AND CALCIUM CHLORIDE: 600; 310; 30; 20 INJECTION, SOLUTION INTRAVENOUS at 10:00

## 2024-04-09 RX ADMIN — METOCLOPRAMIDE 10 MG: 5 INJECTION, SOLUTION INTRAMUSCULAR; INTRAVENOUS at 20:43

## 2024-04-09 RX ADMIN — FAMOTIDINE 20 MG: 10 INJECTION, SOLUTION INTRAVENOUS at 10:01

## 2024-04-09 RX ADMIN — ACETAMINOPHEN 1000 MG: 500 TABLET ORAL at 10:01

## 2024-04-09 RX ADMIN — SIMETHICONE 80 MG: 80 TABLET, CHEWABLE ORAL at 18:54

## 2024-04-09 RX ADMIN — Medication 100 MG: at 12:15

## 2024-04-09 RX ADMIN — SODIUM CHLORIDE, SODIUM LACTATE, POTASSIUM CHLORIDE, AND CALCIUM CHLORIDE: 600; 310; 30; 20 INJECTION, SOLUTION INTRAVENOUS at 12:43

## 2024-04-09 RX ADMIN — ENOXAPARIN SODIUM 40 MG: 100 INJECTION SUBCUTANEOUS at 20:43

## 2024-04-09 RX ADMIN — Medication 10 MG: at 12:29

## 2024-04-09 RX ADMIN — PHENYLEPHRINE HYDROCHLORIDE 100 MCG: 10 INJECTION INTRAVENOUS at 13:44

## 2024-04-09 RX ADMIN — MIDAZOLAM 2 MG: 1 INJECTION INTRAMUSCULAR; INTRAVENOUS at 12:05

## 2024-04-09 RX ADMIN — PHENYLEPHRINE HYDROCHLORIDE 100 MCG: 10 INJECTION INTRAVENOUS at 14:22

## 2024-04-09 RX ADMIN — Medication 10 MG: at 12:25

## 2024-04-09 RX ADMIN — SUGAMMADEX 200 MG: 100 INJECTION, SOLUTION INTRAVENOUS at 14:40

## 2024-04-09 RX ADMIN — WATER 2000 MG: 1 INJECTION INTRAMUSCULAR; INTRAVENOUS; SUBCUTANEOUS at 12:12

## 2024-04-09 RX ADMIN — PROPOFOL 150 MG: 10 INJECTION, EMULSION INTRAVENOUS at 12:14

## 2024-04-09 RX ADMIN — MORPHINE SULFATE 6 MG: 10 INJECTION INTRAVENOUS at 16:05

## 2024-04-09 RX ADMIN — ROCURONIUM BROMIDE 20 MG: 10 INJECTION, SOLUTION INTRAVENOUS at 13:40

## 2024-04-09 RX ADMIN — LIDOCAINE HYDROCHLORIDE 80 MG: 20 INJECTION, SOLUTION EPIDURAL; INFILTRATION; INTRACAUDAL; PERINEURAL at 12:13

## 2024-04-09 RX ADMIN — PHENYLEPHRINE HYDROCHLORIDE 100 MCG: 10 INJECTION INTRAVENOUS at 13:25

## 2024-04-09 RX ADMIN — SODIUM CHLORIDE, SODIUM LACTATE, POTASSIUM CHLORIDE, AND CALCIUM CHLORIDE: 600; 310; 30; 20 INJECTION, SOLUTION INTRAVENOUS at 16:15

## 2024-04-09 RX ADMIN — ONDANSETRON HYDROCHLORIDE 4 MG: 2 INJECTION INTRAMUSCULAR; INTRAVENOUS at 13:40

## 2024-04-09 RX ADMIN — FENTANYL CITRATE 25 MCG: 50 INJECTION INTRAMUSCULAR; INTRAVENOUS at 15:26

## 2024-04-09 RX ADMIN — ROCURONIUM BROMIDE 40 MG: 10 INJECTION, SOLUTION INTRAVENOUS at 12:25

## 2024-04-09 RX ADMIN — PHENYLEPHRINE HYDROCHLORIDE 100 MCG: 10 INJECTION INTRAVENOUS at 14:10

## 2024-04-09 RX ADMIN — ENOXAPARIN SODIUM 40 MG: 100 INJECTION SUBCUTANEOUS at 10:01

## 2024-04-09 RX ADMIN — SODIUM CHLORIDE, SODIUM LACTATE, POTASSIUM CHLORIDE, AND CALCIUM CHLORIDE: 600; 310; 30; 20 INJECTION, SOLUTION INTRAVENOUS at 13:34

## 2024-04-09 RX ADMIN — FENTANYL CITRATE 25 MCG: 50 INJECTION INTRAMUSCULAR; INTRAVENOUS at 15:09

## 2024-04-09 RX ADMIN — MORPHINE SULFATE 6 MG: 10 INJECTION INTRAVENOUS at 20:49

## 2024-04-09 RX ADMIN — METOCLOPRAMIDE 10 MG: 5 INJECTION, SOLUTION INTRAMUSCULAR; INTRAVENOUS at 14:33

## 2024-04-09 RX ADMIN — PHENYLEPHRINE HYDROCHLORIDE 100 MCG: 10 INJECTION INTRAVENOUS at 12:36

## 2024-04-09 RX ADMIN — ROCURONIUM BROMIDE 10 MG: 10 INJECTION, SOLUTION INTRAVENOUS at 12:14

## 2024-04-09 RX ADMIN — LABETALOL HYDROCHLORIDE 10 MG: 5 INJECTION INTRAVENOUS at 15:13

## 2024-04-09 RX ADMIN — SODIUM CHLORIDE, POTASSIUM CHLORIDE, SODIUM LACTATE AND CALCIUM CHLORIDE 1000 ML: 600; 310; 30; 20 INJECTION, SOLUTION INTRAVENOUS at 23:08

## 2024-04-09 RX ADMIN — HYDROMORPHONE HYDROCHLORIDE 0.5 MG: 1 INJECTION, SOLUTION INTRAMUSCULAR; INTRAVENOUS; SUBCUTANEOUS at 14:43

## 2024-04-09 RX ADMIN — FENTANYL CITRATE 100 MCG: 50 INJECTION, SOLUTION INTRAMUSCULAR; INTRAVENOUS at 12:19

## 2024-04-09 RX ADMIN — FENTANYL CITRATE 25 MCG: 50 INJECTION INTRAMUSCULAR; INTRAVENOUS at 15:19

## 2024-04-09 RX ADMIN — NYSTATIN 500000 UNITS: 100000 SUSPENSION ORAL at 10:01

## 2024-04-09 RX ADMIN — WATER 2000 MG: 1 INJECTION INTRAMUSCULAR; INTRAVENOUS; SUBCUTANEOUS at 20:43

## 2024-04-09 RX ADMIN — HYDROMORPHONE HYDROCHLORIDE 0.5 MG: 1 INJECTION, SOLUTION INTRAMUSCULAR; INTRAVENOUS; SUBCUTANEOUS at 12:05

## 2024-04-09 ASSESSMENT — PAIN DESCRIPTION - DESCRIPTORS
DESCRIPTORS: ACHING
DESCRIPTORS: CRAMPING
DESCRIPTORS: CRAMPING
DESCRIPTORS: ACHING
DESCRIPTORS: ACHING
DESCRIPTORS: ACHING;CRAMPING
DESCRIPTORS: ACHING

## 2024-04-09 ASSESSMENT — PAIN DESCRIPTION - PAIN TYPE
TYPE: SURGICAL PAIN

## 2024-04-09 ASSESSMENT — PAIN SCALES - GENERAL
PAINLEVEL_OUTOF10: 6
PAINLEVEL_OUTOF10: 9
PAINLEVEL_OUTOF10: 5
PAINLEVEL_OUTOF10: 7
PAINLEVEL_OUTOF10: 0
PAINLEVEL_OUTOF10: 0
PAINLEVEL_OUTOF10: 5
PAINLEVEL_OUTOF10: 5
PAINLEVEL_OUTOF10: 0
PAINLEVEL_OUTOF10: 10

## 2024-04-09 ASSESSMENT — PAIN DESCRIPTION - FREQUENCY
FREQUENCY: CONTINUOUS
FREQUENCY: INTERMITTENT
FREQUENCY: CONTINUOUS
FREQUENCY: INTERMITTENT

## 2024-04-09 ASSESSMENT — PAIN - FUNCTIONAL ASSESSMENT
PAIN_FUNCTIONAL_ASSESSMENT: PREVENTS OR INTERFERES SOME ACTIVE ACTIVITIES AND ADLS
PAIN_FUNCTIONAL_ASSESSMENT: NONE - DENIES PAIN
PAIN_FUNCTIONAL_ASSESSMENT: PREVENTS OR INTERFERES SOME ACTIVE ACTIVITIES AND ADLS
PAIN_FUNCTIONAL_ASSESSMENT: PREVENTS OR INTERFERES SOME ACTIVE ACTIVITIES AND ADLS

## 2024-04-09 ASSESSMENT — PAIN DESCRIPTION - LOCATION
LOCATION: ABDOMEN

## 2024-04-09 ASSESSMENT — PAIN DESCRIPTION - ORIENTATION
ORIENTATION: ANTERIOR

## 2024-04-09 ASSESSMENT — PAIN DESCRIPTION - ONSET
ONSET: PROGRESSIVE

## 2024-04-09 NOTE — PERIOP NOTE
TRANSFER - OUT REPORT:    Verbal report given to Tricia FOLEY on Smita Multani  being transferred to [address] [place] [address] for routine post-op       Report consisted of patient's Situation, Background, Assessment and   Recommendations(SBAR).     Information from the following report(s) Nurse Handoff Report, Adult Overview, Surgery Report, Intake/Output, MAR, and Recent Results was reviewed with the receiving nurse.           Lines:   Peripheral IV 04/09/24 Posterior;Right Hand (Active)   Site Assessment Clean, dry & intact 04/09/24 1532   Line Status Infusing 04/09/24 1532   Phlebitis Assessment No symptoms 04/09/24 1532   Infiltration Assessment 0 04/09/24 1532   Dressing Status Clean, dry & intact 04/09/24 1532   Dressing Type Transparent 04/09/24 1532        Opportunity for questions and clarification was provided.      Patient transported with:  Registered Nurse

## 2024-04-09 NOTE — NURSE NAVIGATOR
Patient dozing in and out of sleep throughout visit.  Patient complaining of expected pain with mild nausea.      BP (!) 147/71   Pulse 81   Temp 97.6 °F (36.4 °C) (Oral)   Resp 15   Ht 1.6 m (5' 3\")   Wt 82.2 kg (181 lb 4.8 oz)   SpO2 96%   BMI 32.12 kg/m²       General: Alert & oriented to person, place, time, and situation  Abdomen: Appropriate tenderness, soft, non-distended  Lap sites: Within normal limits  LATRICIA Drain: Small amount of serosanguinous drainage  Duron catheter: 175 cc clear, yellow urine  SCD's: In place and operating within normal limits    Plan:  -Continue medications for symptom management  -Encourage ambulation  -SCD use when in bed  -IS 10 times an hour  -NPO  -UGI in AM  -Duron catheter removed    Plan was discussed with the patient, as well as IS teaching provided.  Patient verbalized understanding to plan and education.  Patient completed IS x3 during this visit with no issues nor concerns noted by this RN.  She reached 750 mL.

## 2024-04-09 NOTE — PROGRESS NOTES
1552  Patient arrived to unit at this time. Patient AAOx4. PIV is C/D/I with fluids infusing, no s/s of infiltration or phlebitis. VSS on RA. Breath sounds clear bilaterally. Patient reaching 500 on IS. Gauze dressing to 5 abdominal lap sites are C/D/I. LATRICIA drain charged and patent, dressing is C/D/I. Urinary vazquez patent and draining. Patient not passing flatus. SCDs intact to BLE. No other concerns at this time. Possessions within reach, will continue to monitor.      1605  Patient states pain 10/10, PRN pain meds given per mar.  OG Garcia in room to assess patient. This RN made OG Garcia aware of LATRICIA color maroon. This RN to continue monitoring LATRICIA color, output and VS, and notify with any changes.     1819  300mL urine emptied from urinary vazquez.  5mL maroon fluid emptied from LATRICIA drain.  Patient ambulated out into hallway and back to bed. Patient left with safety measures intact and call bell within reach.     1907  Change of shift report given to OG Montiel

## 2024-04-09 NOTE — INTERVAL H&P NOTE
Update History & Physical    The patient's History and Physical of April 1, 2024 was reviewed with the patient and I examined the patient. There was no change. The surgical site was confirmed by the patient and me.     Plan: The risks, benefits, expected outcome, and alternative to the recommended procedure have been discussed with the patient. Patient understands and wants to proceed with the procedure.     Electronically signed by Srinath Barreto MD on 4/9/2024 at 11:13 AM

## 2024-04-09 NOTE — ANESTHESIA POSTPROCEDURE EVALUATION
Post-Anesthesia Evaluation and Assessment    Cardiovascular Function/Vital Signs/Pain Score:  Vitals  BP: (!) 152/84  Temp: 97.9 °F (36.6 °C)  Temp Source: Temporal  Pulse: 77  Respirations: 12  SpO2: 96 %  Height: 160 cm (5' 3\")  Weight - Scale: 82.2 kg (181 lb 4.8 oz)  Pain Level: 5    Patient is status post Procedure(s):  1. LAPAROSCOPIC GASTRIC BYPASS CONVERSION 2.PATRIAL GASTRECTOMY 3.LIVER WEDGE BIOPSY 4.INTRAOPERATIVE ENDOSCOPY 5. Lysis of adheasions 1 hour.    Nausea/Vomiting: Controlled.    Postoperative hydration reviewed and adequate.    Pain:  Managed    Mental Status and Level of Consciousness: Baseline and appropriate for discharge.    Adequate oxygenation and airway patent.    Complications related to anesthesia: None    Post-anesthesia assessment completed. No concerns.    Patient has met all discharge requirements.    Signed By: Kevin Velasquez MD    April 9, 2024

## 2024-04-09 NOTE — PERIOP NOTE
2 [facility] made aware that SBAR is ready for review. Patient assigned room #211. OG Clarke will be the nurse

## 2024-04-09 NOTE — PERIOP NOTE
Reviewed PTA medication list with patient/caregiver and patient/caregiver denies any additional medications.     Patient admits to having a responsible adult care for them at home for at least 24 hours after surgery.    Patient encouraged to use gown warming system and informed that using said warming gown to regulate body temperature prior to a procedure has been shown to help reduce the risks of blood clots and infection.    Patient's pharmacy of choice verified and documented in PTA medication section.    Dual skin assessment & fall risk band verification completed with Joseline FOLEY.

## 2024-04-09 NOTE — BRIEF OP NOTE
Brief Postoperative Note      Patient: Smita Multani  YOB: 1965  MRN: [id number]    Date of Procedure: 4/9/2024    Pre-Op Diagnosis Codes:     * Gastroesophageal reflux disease with esophagitis, unspecified whether hemorrhage [K21.00]     * Bariatric surgery status [Z98.84]    Post-Op Diagnosis: Same       Procedure(s):  1. LAPAROSCOPIC GASTRIC BYPASS CONVERSION 2.PATRIAL GASTRECTOMY 3.LIVER WEDGE BIOPSY 4.INTRAOPERATIVE ENDOSCOPY 5. Lysis of adheasions 1 hour    Surgeon(s):  Srinath Barreto MD    Assistant:  Surgical Assistant: Edna Scott  Physician Assistant: Wilfrido Leger PA    Anesthesia: General    Estimated Blood Loss (mL): less than 50     Complications: None    Specimens:   ID Type Source Tests Collected by Time Destination   A : PARTIAL GASTRECTOMY Tissue Stomach SURGICAL PATHOLOGY Srinath Barreto MD 4/9/2024 1423    B : WEDGE LIVER BIOPSY Tissue Liver SURGICAL PATHOLOGY Srinath Barreto MD 4/9/2024 1428        Implants:  * No implants in log *      Drains:   Closed/Suction Drain LUQ Bulb (Active)       Urinary Catheter 04/09/24 Duron (Active)       Findings:  Infection Present At Time Of Surgery (PATOS) (choose all levels that have infection present):  No infection present  Other Findings: see dictation    Electronically signed by Srinath Barreto MD on 4/9/2024 at 3:00 PM

## 2024-04-09 NOTE — PERIOP NOTE
TRANSFER - IN REPORT:    Verbal report received from OR Nurse and CRNA on Smita Multani  being received from OR for routine post-op      Report consisted of patient's Situation, Background, Assessment and   Recommendations(SBAR).     Information from the following report(s) Nurse Handoff Report, Adult Overview, Surgery Report, Intake/Output, MAR, and Recent Results was reviewed with the receiving nurse.    Opportunity for questions and clarification was provided.      Assessment completed upon patient's arrival to unit and care assumed.

## 2024-04-09 NOTE — PROGRESS NOTES
1907 - Assumed care at this time.     1927 - Patient A&Ox4 drowsy, RA. Denies chest pain and SOB. Pt getting up to 500 on IS. Denies nausea/ denies vomiting. Denies flatus, pt denies belching. SCD compression device bilaterally. x5 lap sites with gauze/tape dressings to abdomen C/D/I. LATRICIA/Vazquez draining and patent. Denies numbness/tingling/calf pain.  Pain 7/10 with a tolerable level of 5/10. Pt educated on IS use, diet, q2h rounds, importance of ambulation, pain management, and 5am being the last dose of narcotics prior to GI study. Pt verbalized understanding, no concerns voiced. Call bell within reach, bed in lowest position. Pt encouraged to call for assistance.    2304 - Manual BP 90/60,  with minimal output from vazquez. Dr. Barreto notified of current VS as well as drain/vazquez output/color. Telephone orders received with read back for 1000 mL LR bolus, stat CBC, as well as type/cross and to hold 2 units of blood. Bolus orders and CBC orders then placed by Dr. Barreto. Pt's blood band is good for another 3 days being that it was placed on 4/9/2024. Order to hold 2 units placed.     2330 - CBC dropped off to lab.    2355 - Verbal orders received from Dr. Barreto for another LR bolus of 500 mL.    0113 - Blood verified and transfusion began. This RN stayed with pt for first 15 minutes. No adverse reactions noted. Unable to retrieve post 15 minute observation vitals due to pt being prepped and transported to OR.

## 2024-04-09 NOTE — ANESTHESIA PRE PROCEDURE
\"YAM9XWN\", \"GIR1ZHP\", \"BEART\", \"L2JPXQFG\"     Type & Screen (If Applicable):  No results found for: \"LABABO\", \"LABRH\"    Drug/Infectious Status (If Applicable):  No results found for: \"HIV\", \"HEPCAB\"    COVID-19 Screening (If Applicable): No results found for: \"COVID19\"        Anesthesia Evaluation  Patient summary reviewed and Nursing notes reviewed   no history of anesthetic complications:   Airway: Mallampati: II  TM distance: >3 FB   Neck ROM: full  Mouth opening: > = 3 FB   Dental: normal exam         Pulmonary:Negative Pulmonary ROS                              Cardiovascular:Negative CV ROS  Exercise tolerance: good (>4 METS)                 ROS comment: 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         Other Findings:       Anesthesia Plan      general     ASA 3       Induction: intravenous.    MIPS: Postoperative opioids intended.  Anesthetic plan and risks discussed with patient and spouse.      Plan discussed with CRNA.    Attending anesthesiologist reviewed and agrees with Preprocedure content            Kevin Velasquez MD   4/9/2024

## 2024-04-10 ENCOUNTER — ANESTHESIA (OUTPATIENT)
Facility: HOSPITAL | Age: 59
End: 2024-04-10
Payer: MEDICAID

## 2024-04-10 ENCOUNTER — APPOINTMENT (OUTPATIENT)
Facility: HOSPITAL | Age: 59
End: 2024-04-10
Attending: SPECIALIST
Payer: MEDICAID

## 2024-04-10 ENCOUNTER — ANESTHESIA EVENT (OUTPATIENT)
Facility: HOSPITAL | Age: 59
End: 2024-04-10
Payer: MEDICAID

## 2024-04-10 PROBLEM — R58 BLEEDING: Status: ACTIVE | Noted: 2024-04-10

## 2024-04-10 LAB
ALBUMIN SERPL-MCNC: 2.6 G/DL (ref 3.4–5)
ALBUMIN/GLOB SERPL: 1 (ref 0.8–1.7)
ALP SERPL-CCNC: 65 U/L (ref 45–117)
ALT SERPL-CCNC: 110 U/L (ref 13–56)
ANION GAP SERPL CALC-SCNC: 5 MMOL/L (ref 3–18)
APTT PPP: 29.1 SEC (ref 23–36.4)
AST SERPL-CCNC: 119 U/L (ref 10–38)
BASOPHILS # BLD: 0 K/UL (ref 0–0.1)
BASOPHILS # BLD: 0.1 K/UL (ref 0–0.1)
BASOPHILS NFR BLD: 0 % (ref 0–2)
BASOPHILS NFR BLD: 0 % (ref 0–2)
BILIRUB SERPL-MCNC: 0.4 MG/DL (ref 0.2–1)
BUN SERPL-MCNC: 10 MG/DL (ref 7–18)
BUN/CREAT SERPL: 16 (ref 12–20)
CALCIUM SERPL-MCNC: 7.8 MG/DL (ref 8.5–10.1)
CHLORIDE SERPL-SCNC: 105 MMOL/L (ref 100–111)
CO2 SERPL-SCNC: 27 MMOL/L (ref 21–32)
CREAT SERPL-MCNC: 0.64 MG/DL (ref 0.6–1.3)
DIFFERENTIAL METHOD BLD: ABNORMAL
DIFFERENTIAL METHOD BLD: ABNORMAL
EOSINOPHIL # BLD: 0 K/UL (ref 0–0.4)
EOSINOPHIL # BLD: 0 K/UL (ref 0–0.4)
EOSINOPHIL NFR BLD: 0 % (ref 0–5)
EOSINOPHIL NFR BLD: 0 % (ref 0–5)
ERYTHROCYTE [DISTWIDTH] IN BLOOD BY AUTOMATED COUNT: 12.2 % (ref 11.6–14.5)
ERYTHROCYTE [DISTWIDTH] IN BLOOD BY AUTOMATED COUNT: 12.7 % (ref 11.6–14.5)
ERYTHROCYTE [DISTWIDTH] IN BLOOD BY AUTOMATED COUNT: 12.8 % (ref 11.6–14.5)
FIBRINOGEN PPP-MCNC: 428 MG/DL (ref 210–451)
GLOBULIN SER CALC-MCNC: 2.5 G/DL (ref 2–4)
GLUCOSE SERPL-MCNC: 161 MG/DL (ref 74–99)
HCT VFR BLD AUTO: 31.9 % (ref 35–45)
HCT VFR BLD AUTO: 35.4 % (ref 35–45)
HCT VFR BLD AUTO: 39.4 % (ref 35–45)
HGB BLD-MCNC: 10.6 G/DL (ref 12–16)
HGB BLD-MCNC: 12.5 G/DL (ref 12–16)
HGB BLD-MCNC: 13.2 G/DL (ref 12–16)
HISTORY CHECK: NORMAL
IMM GRANULOCYTES # BLD AUTO: 0.1 K/UL (ref 0–0.04)
IMM GRANULOCYTES # BLD AUTO: 0.1 K/UL (ref 0–0.04)
IMM GRANULOCYTES NFR BLD AUTO: 0 % (ref 0–0.5)
IMM GRANULOCYTES NFR BLD AUTO: 0 % (ref 0–0.5)
INR PPP: 1.1 (ref 0.9–1.1)
LACTATE SERPL-SCNC: 1.7 MMOL/L (ref 0.4–2)
LYMPHOCYTES # BLD: 1 K/UL (ref 0.9–3.6)
LYMPHOCYTES # BLD: 1.3 K/UL (ref 0.9–3.6)
LYMPHOCYTES NFR BLD: 6 % (ref 21–52)
LYMPHOCYTES NFR BLD: 8 % (ref 21–52)
MCH RBC QN AUTO: 29.7 PG (ref 24–34)
MCH RBC QN AUTO: 30.2 PG (ref 24–34)
MCH RBC QN AUTO: 30.6 PG (ref 24–34)
MCHC RBC AUTO-ENTMCNC: 33.2 G/DL (ref 31–37)
MCHC RBC AUTO-ENTMCNC: 33.5 G/DL (ref 31–37)
MCHC RBC AUTO-ENTMCNC: 35.3 G/DL (ref 31–37)
MCV RBC AUTO: 86.6 FL (ref 78–100)
MCV RBC AUTO: 89.4 FL (ref 78–100)
MCV RBC AUTO: 90.2 FL (ref 78–100)
MONOCYTES # BLD: 0.7 K/UL (ref 0.05–1.2)
MONOCYTES # BLD: 1 K/UL (ref 0.05–1.2)
MONOCYTES NFR BLD: 4 % (ref 3–10)
MONOCYTES NFR BLD: 6 % (ref 3–10)
NEUTS SEG # BLD: 13.9 K/UL (ref 1.8–8)
NEUTS SEG # BLD: 14.5 K/UL (ref 1.8–8)
NEUTS SEG NFR BLD: 85 % (ref 40–73)
NEUTS SEG NFR BLD: 89 % (ref 40–73)
NRBC # BLD: 0 K/UL (ref 0–0.01)
NRBC BLD-RTO: 0 PER 100 WBC
PLATELET # BLD AUTO: 217 K/UL (ref 135–420)
PLATELET # BLD AUTO: 231 K/UL (ref 135–420)
PLATELET # BLD AUTO: 289 K/UL (ref 135–420)
PMV BLD AUTO: 8.7 FL (ref 9.2–11.8)
PMV BLD AUTO: 9.2 FL (ref 9.2–11.8)
PMV BLD AUTO: 9.2 FL (ref 9.2–11.8)
POTASSIUM SERPL-SCNC: 3.9 MMOL/L (ref 3.5–5.5)
PROT SERPL-MCNC: 5.1 G/DL (ref 6.4–8.2)
PROTHROMBIN TIME: 14.1 SEC (ref 11.9–14.7)
RBC # BLD AUTO: 3.57 M/UL (ref 4.2–5.3)
RBC # BLD AUTO: 4.09 M/UL (ref 4.2–5.3)
RBC # BLD AUTO: 4.37 M/UL (ref 4.2–5.3)
SODIUM SERPL-SCNC: 137 MMOL/L (ref 136–145)
WBC # BLD AUTO: 16.3 K/UL (ref 4.6–13.2)
WBC # BLD AUTO: 16.4 K/UL (ref 4.6–13.2)
WBC # BLD AUTO: 20.4 K/UL (ref 4.6–13.2)

## 2024-04-10 PROCEDURE — A4216 STERILE WATER/SALINE, 10 ML: HCPCS | Performed by: SPECIALIST

## 2024-04-10 PROCEDURE — 0DQ74ZZ REPAIR STOMACH, PYLORUS, PERCUTANEOUS ENDOSCOPIC APPROACH: ICD-10-PCS | Performed by: SPECIALIST

## 2024-04-10 PROCEDURE — 6360000002 HC RX W HCPCS: Performed by: SPECIALIST

## 2024-04-10 PROCEDURE — C9113 INJ PANTOPRAZOLE SODIUM, VIA: HCPCS | Performed by: SPECIALIST

## 2024-04-10 PROCEDURE — 85730 THROMBOPLASTIN TIME PARTIAL: CPT

## 2024-04-10 PROCEDURE — 2580000003 HC RX 258: Performed by: ANESTHESIOLOGY

## 2024-04-10 PROCEDURE — 7100000000 HC PACU RECOVERY - FIRST 15 MIN: Performed by: SPECIALIST

## 2024-04-10 PROCEDURE — 2720000010 HC SURG SUPPLY STERILE: Performed by: SPECIALIST

## 2024-04-10 PROCEDURE — 7100000001 HC PACU RECOVERY - ADDTL 15 MIN: Performed by: SPECIALIST

## 2024-04-10 PROCEDURE — 0WCG4ZZ EXTIRPATION OF MATTER FROM PERITONEAL CAVITY, PERCUTANEOUS ENDOSCOPIC APPROACH: ICD-10-PCS | Performed by: SPECIALIST

## 2024-04-10 PROCEDURE — 80053 COMPREHEN METABOLIC PANEL: CPT

## 2024-04-10 PROCEDURE — 2500000003 HC RX 250 WO HCPCS: Performed by: NURSE ANESTHETIST, CERTIFIED REGISTERED

## 2024-04-10 PROCEDURE — 3600000015 HC SURGERY LEVEL 5 ADDTL 15MIN: Performed by: SPECIALIST

## 2024-04-10 PROCEDURE — 3700000001 HC ADD 15 MINUTES (ANESTHESIA): Performed by: SPECIALIST

## 2024-04-10 PROCEDURE — 2700000000 HC OXYGEN THERAPY PER DAY

## 2024-04-10 PROCEDURE — 3700000000 HC ANESTHESIA ATTENDED CARE: Performed by: SPECIALIST

## 2024-04-10 PROCEDURE — 3600000005 HC SURGERY LEVEL 5 BASE: Performed by: SPECIALIST

## 2024-04-10 PROCEDURE — 2580000003 HC RX 258: Performed by: NURSE ANESTHETIST, CERTIFIED REGISTERED

## 2024-04-10 PROCEDURE — 85025 COMPLETE CBC W/AUTO DIFF WBC: CPT

## 2024-04-10 PROCEDURE — 85384 FIBRINOGEN ACTIVITY: CPT

## 2024-04-10 PROCEDURE — 71045 X-RAY EXAM CHEST 1 VIEW: CPT

## 2024-04-10 PROCEDURE — 2580000003 HC RX 258: Performed by: SPECIALIST

## 2024-04-10 PROCEDURE — 83605 ASSAY OF LACTIC ACID: CPT

## 2024-04-10 PROCEDURE — 85610 PROTHROMBIN TIME: CPT

## 2024-04-10 PROCEDURE — 2500000003 HC RX 250 WO HCPCS: Performed by: SPECIALIST

## 2024-04-10 PROCEDURE — 36430 TRANSFUSION BLD/BLD COMPNT: CPT

## 2024-04-10 PROCEDURE — 30233N1 TRANSFUSION OF NONAUTOLOGOUS RED BLOOD CELLS INTO PERIPHERAL VEIN, PERCUTANEOUS APPROACH: ICD-10-PCS | Performed by: SPECIALIST

## 2024-04-10 PROCEDURE — 2709999900 HC NON-CHARGEABLE SUPPLY: Performed by: SPECIALIST

## 2024-04-10 PROCEDURE — P9016 RBC LEUKOCYTES REDUCED: HCPCS

## 2024-04-10 PROCEDURE — 85027 COMPLETE CBC AUTOMATED: CPT

## 2024-04-10 PROCEDURE — 2000000000 HC ICU R&B

## 2024-04-10 PROCEDURE — 6360000002 HC RX W HCPCS: Performed by: NURSE ANESTHETIST, CERTIFIED REGISTERED

## 2024-04-10 PROCEDURE — 36415 COLL VENOUS BLD VENIPUNCTURE: CPT

## 2024-04-10 RX ORDER — PHENYLEPHRINE HYDROCHLORIDE 10 MG/ML
INJECTION INTRAVENOUS PRN
Status: DISCONTINUED | OUTPATIENT
Start: 2024-04-10 | End: 2024-04-10 | Stop reason: SDUPTHER

## 2024-04-10 RX ORDER — SUCCINYLCHOLINE/SOD CL,ISO/PF 100 MG/5ML
SYRINGE (ML) INTRAVENOUS PRN
Status: DISCONTINUED | OUTPATIENT
Start: 2024-04-10 | End: 2024-04-10 | Stop reason: SDUPTHER

## 2024-04-10 RX ORDER — ROCURONIUM BROMIDE 10 MG/ML
INJECTION, SOLUTION INTRAVENOUS PRN
Status: DISCONTINUED | OUTPATIENT
Start: 2024-04-10 | End: 2024-04-10 | Stop reason: SDUPTHER

## 2024-04-10 RX ORDER — ONDANSETRON 2 MG/ML
4 INJECTION INTRAMUSCULAR; INTRAVENOUS
Status: DISCONTINUED | OUTPATIENT
Start: 2024-04-10 | End: 2024-04-10 | Stop reason: HOSPADM

## 2024-04-10 RX ORDER — OXYCODONE HYDROCHLORIDE 5 MG/1
5 TABLET ORAL
Status: DISCONTINUED | OUTPATIENT
Start: 2024-04-10 | End: 2024-04-10

## 2024-04-10 RX ORDER — DROPERIDOL 2.5 MG/ML
0.62 INJECTION, SOLUTION INTRAMUSCULAR; INTRAVENOUS
Status: DISCONTINUED | OUTPATIENT
Start: 2024-04-10 | End: 2024-04-10 | Stop reason: HOSPADM

## 2024-04-10 RX ORDER — FENTANYL CITRATE 50 UG/ML
INJECTION, SOLUTION INTRAMUSCULAR; INTRAVENOUS PRN
Status: DISCONTINUED | OUTPATIENT
Start: 2024-04-10 | End: 2024-04-10 | Stop reason: SDUPTHER

## 2024-04-10 RX ORDER — IPRATROPIUM BROMIDE AND ALBUTEROL SULFATE 2.5; .5 MG/3ML; MG/3ML
1 SOLUTION RESPIRATORY (INHALATION)
Status: DISCONTINUED | OUTPATIENT
Start: 2024-04-10 | End: 2024-04-10 | Stop reason: HOSPADM

## 2024-04-10 RX ORDER — FENTANYL CITRATE 50 UG/ML
25 INJECTION, SOLUTION INTRAMUSCULAR; INTRAVENOUS EVERY 5 MIN PRN
Status: DISCONTINUED | OUTPATIENT
Start: 2024-04-10 | End: 2024-04-10 | Stop reason: HOSPADM

## 2024-04-10 RX ORDER — PROPOFOL 10 MG/ML
INJECTION, EMULSION INTRAVENOUS PRN
Status: DISCONTINUED | OUTPATIENT
Start: 2024-04-10 | End: 2024-04-10 | Stop reason: SDUPTHER

## 2024-04-10 RX ORDER — BUPIVACAINE HYDROCHLORIDE AND EPINEPHRINE 2.5; 5 MG/ML; UG/ML
INJECTION, SOLUTION EPIDURAL; INFILTRATION; INTRACAUDAL; PERINEURAL PRN
Status: DISCONTINUED | OUTPATIENT
Start: 2024-04-10 | End: 2024-04-10 | Stop reason: ALTCHOICE

## 2024-04-10 RX ORDER — MEPERIDINE HYDROCHLORIDE 50 MG/ML
12.5 INJECTION INTRAMUSCULAR; INTRAVENOUS; SUBCUTANEOUS AS NEEDED
Status: DISCONTINUED | OUTPATIENT
Start: 2024-04-10 | End: 2024-04-10

## 2024-04-10 RX ORDER — SODIUM CHLORIDE 0.9 % (FLUSH) 0.9 %
5-40 SYRINGE (ML) INJECTION EVERY 12 HOURS SCHEDULED
Status: DISCONTINUED | OUTPATIENT
Start: 2024-04-10 | End: 2024-04-10 | Stop reason: HOSPADM

## 2024-04-10 RX ORDER — SODIUM CHLORIDE 0.9 % (FLUSH) 0.9 %
5-40 SYRINGE (ML) INJECTION PRN
Status: DISCONTINUED | OUTPATIENT
Start: 2024-04-10 | End: 2024-04-10 | Stop reason: HOSPADM

## 2024-04-10 RX ORDER — SODIUM CHLORIDE 9 MG/ML
INJECTION, SOLUTION INTRAVENOUS PRN
Status: DISCONTINUED | OUTPATIENT
Start: 2024-04-10 | End: 2024-04-13 | Stop reason: HOSPADM

## 2024-04-10 RX ORDER — HYDROMORPHONE HYDROCHLORIDE 1 MG/ML
0.5 INJECTION, SOLUTION INTRAMUSCULAR; INTRAVENOUS; SUBCUTANEOUS
Status: DISCONTINUED | OUTPATIENT
Start: 2024-04-10 | End: 2024-04-13 | Stop reason: HOSPADM

## 2024-04-10 RX ORDER — SODIUM CHLORIDE, SODIUM LACTATE, POTASSIUM CHLORIDE, CALCIUM CHLORIDE 600; 310; 30; 20 MG/100ML; MG/100ML; MG/100ML; MG/100ML
INJECTION, SOLUTION INTRAVENOUS CONTINUOUS
Status: DISCONTINUED | OUTPATIENT
Start: 2024-04-10 | End: 2024-04-10

## 2024-04-10 RX ORDER — NALOXONE HYDROCHLORIDE 0.4 MG/ML
INJECTION, SOLUTION INTRAMUSCULAR; INTRAVENOUS; SUBCUTANEOUS PRN
Status: DISCONTINUED | OUTPATIENT
Start: 2024-04-10 | End: 2024-04-10 | Stop reason: HOSPADM

## 2024-04-10 RX ORDER — HYDROMORPHONE HYDROCHLORIDE 1 MG/ML
0.5 INJECTION, SOLUTION INTRAMUSCULAR; INTRAVENOUS; SUBCUTANEOUS EVERY 5 MIN PRN
Status: DISCONTINUED | OUTPATIENT
Start: 2024-04-10 | End: 2024-04-10 | Stop reason: HOSPADM

## 2024-04-10 RX ORDER — LABETALOL HYDROCHLORIDE 5 MG/ML
10 INJECTION, SOLUTION INTRAVENOUS
Status: DISCONTINUED | OUTPATIENT
Start: 2024-04-10 | End: 2024-04-10 | Stop reason: HOSPADM

## 2024-04-10 RX ORDER — SODIUM CHLORIDE 9 MG/ML
INJECTION, SOLUTION INTRAVENOUS PRN
Status: DISCONTINUED | OUTPATIENT
Start: 2024-04-10 | End: 2024-04-10 | Stop reason: HOSPADM

## 2024-04-10 RX ORDER — DEXAMETHASONE SODIUM PHOSPHATE 4 MG/ML
INJECTION, SOLUTION INTRA-ARTICULAR; INTRALESIONAL; INTRAMUSCULAR; INTRAVENOUS; SOFT TISSUE PRN
Status: DISCONTINUED | OUTPATIENT
Start: 2024-04-10 | End: 2024-04-10 | Stop reason: SDUPTHER

## 2024-04-10 RX ORDER — MIDAZOLAM HYDROCHLORIDE 1 MG/ML
INJECTION INTRAMUSCULAR; INTRAVENOUS PRN
Status: DISCONTINUED | OUTPATIENT
Start: 2024-04-10 | End: 2024-04-10 | Stop reason: SDUPTHER

## 2024-04-10 RX ORDER — SODIUM CHLORIDE 9 MG/ML
INJECTION, SOLUTION INTRAVENOUS CONTINUOUS PRN
Status: DISCONTINUED | OUTPATIENT
Start: 2024-04-10 | End: 2024-04-10 | Stop reason: SDUPTHER

## 2024-04-10 RX ORDER — LIDOCAINE HYDROCHLORIDE 20 MG/ML
INJECTION, SOLUTION INTRAVENOUS PRN
Status: DISCONTINUED | OUTPATIENT
Start: 2024-04-10 | End: 2024-04-10 | Stop reason: SDUPTHER

## 2024-04-10 RX ORDER — DIPHENHYDRAMINE HYDROCHLORIDE 50 MG/ML
12.5 INJECTION INTRAMUSCULAR; INTRAVENOUS
Status: DISCONTINUED | OUTPATIENT
Start: 2024-04-10 | End: 2024-04-10 | Stop reason: HOSPADM

## 2024-04-10 RX ORDER — ONDANSETRON 2 MG/ML
INJECTION INTRAMUSCULAR; INTRAVENOUS PRN
Status: DISCONTINUED | OUTPATIENT
Start: 2024-04-10 | End: 2024-04-10 | Stop reason: SDUPTHER

## 2024-04-10 RX ADMIN — ROCURONIUM BROMIDE 10 MG: 10 INJECTION, SOLUTION INTRAVENOUS at 03:20

## 2024-04-10 RX ADMIN — SODIUM CHLORIDE, PRESERVATIVE FREE 10 ML: 5 INJECTION INTRAVENOUS at 07:39

## 2024-04-10 RX ADMIN — DEXAMETHASONE SODIUM PHOSPHATE 4 MG: 4 INJECTION INTRA-ARTICULAR; INTRALESIONAL; INTRAMUSCULAR; INTRAVENOUS; SOFT TISSUE at 02:29

## 2024-04-10 RX ADMIN — HYDROMORPHONE HYDROCHLORIDE 0.5 MG: 1 INJECTION, SOLUTION INTRAMUSCULAR; INTRAVENOUS; SUBCUTANEOUS at 18:05

## 2024-04-10 RX ADMIN — SODIUM CHLORIDE, SODIUM LACTATE, POTASSIUM CHLORIDE, AND CALCIUM CHLORIDE: 600; 310; 30; 20 INJECTION, SOLUTION INTRAVENOUS at 11:00

## 2024-04-10 RX ADMIN — ROCURONIUM BROMIDE 10 MG: 10 INJECTION, SOLUTION INTRAVENOUS at 02:36

## 2024-04-10 RX ADMIN — SODIUM CHLORIDE, SODIUM LACTATE, POTASSIUM CHLORIDE, AND CALCIUM CHLORIDE 125 ML: 600; 310; 30; 20 INJECTION, SOLUTION INTRAVENOUS at 04:54

## 2024-04-10 RX ADMIN — ROCURONIUM BROMIDE 30 MG: 10 INJECTION, SOLUTION INTRAVENOUS at 02:16

## 2024-04-10 RX ADMIN — LIDOCAINE HYDROCHLORIDE 100 MG: 20 INJECTION, SOLUTION INTRAVENOUS at 02:04

## 2024-04-10 RX ADMIN — PROPOFOL 140 MG: 10 INJECTION, EMULSION INTRAVENOUS at 02:04

## 2024-04-10 RX ADMIN — WATER 2000 MG: 1 INJECTION INTRAMUSCULAR; INTRAVENOUS; SUBCUTANEOUS at 15:33

## 2024-04-10 RX ADMIN — SODIUM CHLORIDE: 9 INJECTION, SOLUTION INTRAVENOUS at 02:56

## 2024-04-10 RX ADMIN — MIDAZOLAM 2 MG: 1 INJECTION INTRAMUSCULAR; INTRAVENOUS at 02:16

## 2024-04-10 RX ADMIN — PHENYLEPHRINE HYDROCHLORIDE 200 MCG: 10 INJECTION INTRAVENOUS at 02:04

## 2024-04-10 RX ADMIN — FENTANYL CITRATE 25 MCG: 50 INJECTION, SOLUTION INTRAMUSCULAR; INTRAVENOUS at 03:21

## 2024-04-10 RX ADMIN — HYDROMORPHONE HYDROCHLORIDE 0.5 MG: 1 INJECTION, SOLUTION INTRAMUSCULAR; INTRAVENOUS; SUBCUTANEOUS at 23:41

## 2024-04-10 RX ADMIN — ROCURONIUM BROMIDE 10 MG: 10 INJECTION, SOLUTION INTRAVENOUS at 02:52

## 2024-04-10 RX ADMIN — SODIUM CHLORIDE: 9 INJECTION, SOLUTION INTRAVENOUS at 01:55

## 2024-04-10 RX ADMIN — FENTANYL CITRATE 25 MCG: 50 INJECTION, SOLUTION INTRAMUSCULAR; INTRAVENOUS at 03:59

## 2024-04-10 RX ADMIN — SODIUM CHLORIDE, SODIUM LACTATE, POTASSIUM CHLORIDE, AND CALCIUM CHLORIDE: 600; 310; 30; 20 INJECTION, SOLUTION INTRAVENOUS at 02:44

## 2024-04-10 RX ADMIN — HYDROMORPHONE HYDROCHLORIDE 0.5 MG: 1 INJECTION, SOLUTION INTRAMUSCULAR; INTRAVENOUS; SUBCUTANEOUS at 07:36

## 2024-04-10 RX ADMIN — HYDROMORPHONE HYDROCHLORIDE 0.5 MG: 1 INJECTION, SOLUTION INTRAMUSCULAR; INTRAVENOUS; SUBCUTANEOUS at 11:24

## 2024-04-10 RX ADMIN — HYDROMORPHONE HYDROCHLORIDE 0.5 MG: 1 INJECTION, SOLUTION INTRAMUSCULAR; INTRAVENOUS; SUBCUTANEOUS at 20:26

## 2024-04-10 RX ADMIN — ONDANSETRON HYDROCHLORIDE 4 MG: 2 INJECTION INTRAMUSCULAR; INTRAVENOUS at 03:03

## 2024-04-10 RX ADMIN — HYDROMORPHONE HYDROCHLORIDE 0.5 MG: 1 INJECTION, SOLUTION INTRAMUSCULAR; INTRAVENOUS; SUBCUTANEOUS at 14:35

## 2024-04-10 RX ADMIN — SODIUM CHLORIDE, PRESERVATIVE FREE 10 ML: 5 INJECTION INTRAVENOUS at 23:40

## 2024-04-10 RX ADMIN — WATER 2000 MG: 1 INJECTION INTRAMUSCULAR; INTRAVENOUS; SUBCUTANEOUS at 02:20

## 2024-04-10 RX ADMIN — PANTOPRAZOLE SODIUM 40 MG: 40 INJECTION, POWDER, FOR SOLUTION INTRAVENOUS at 00:44

## 2024-04-10 RX ADMIN — PHENYLEPHRINE HYDROCHLORIDE 100 MCG: 10 INJECTION INTRAVENOUS at 02:12

## 2024-04-10 RX ADMIN — METOCLOPRAMIDE 10 MG: 5 INJECTION, SOLUTION INTRAMUSCULAR; INTRAVENOUS at 20:29

## 2024-04-10 RX ADMIN — WATER 2000 MG: 1 INJECTION INTRAMUSCULAR; INTRAVENOUS; SUBCUTANEOUS at 08:52

## 2024-04-10 RX ADMIN — Medication 100 MG: at 02:04

## 2024-04-10 RX ADMIN — HYDROMORPHONE HYDROCHLORIDE 0.5 MG: 1 INJECTION, SOLUTION INTRAMUSCULAR; INTRAVENOUS; SUBCUTANEOUS at 04:04

## 2024-04-10 RX ADMIN — SODIUM CHLORIDE, POTASSIUM CHLORIDE, SODIUM LACTATE AND CALCIUM CHLORIDE 500 ML: 600; 310; 30; 20 INJECTION, SOLUTION INTRAVENOUS at 00:06

## 2024-04-10 RX ADMIN — METOCLOPRAMIDE 10 MG: 5 INJECTION, SOLUTION INTRAMUSCULAR; INTRAVENOUS at 07:36

## 2024-04-10 RX ADMIN — FENTANYL CITRATE 25 MCG: 50 INJECTION, SOLUTION INTRAMUSCULAR; INTRAVENOUS at 02:44

## 2024-04-10 RX ADMIN — SUGAMMADEX 160 MG: 100 INJECTION, SOLUTION INTRAVENOUS at 04:18

## 2024-04-10 RX ADMIN — FENTANYL CITRATE 25 MCG: 50 INJECTION, SOLUTION INTRAMUSCULAR; INTRAVENOUS at 04:02

## 2024-04-10 RX ADMIN — METOCLOPRAMIDE 10 MG: 5 INJECTION, SOLUTION INTRAMUSCULAR; INTRAVENOUS at 14:36

## 2024-04-10 RX ADMIN — PHENYLEPHRINE HYDROCHLORIDE 100 MCG: 10 INJECTION INTRAVENOUS at 04:11

## 2024-04-10 ASSESSMENT — PAIN SCALES - GENERAL
PAINLEVEL_OUTOF10: 0
PAINLEVEL_OUTOF10: 9
PAINLEVEL_OUTOF10: 5
PAINLEVEL_OUTOF10: 0
PAINLEVEL_OUTOF10: 0
PAINLEVEL_OUTOF10: 9
PAINLEVEL_OUTOF10: 0
PAINLEVEL_OUTOF10: 0
PAINLEVEL_OUTOF10: 6
PAINLEVEL_OUTOF10: 5
PAINLEVEL_OUTOF10: 8
PAINLEVEL_OUTOF10: 8
PAINLEVEL_OUTOF10: 0
PAINLEVEL_OUTOF10: 5
PAINLEVEL_OUTOF10: 0
PAINLEVEL_OUTOF10: 9

## 2024-04-10 ASSESSMENT — PAIN DESCRIPTION - LOCATION
LOCATION: ABDOMEN;INCISION
LOCATION: ABDOMEN
LOCATION: ABDOMEN;INCISION
LOCATION: ABDOMEN

## 2024-04-10 ASSESSMENT — PAIN DESCRIPTION - DESCRIPTORS
DESCRIPTORS: ACHING;PRESSURE
DESCRIPTORS: ACHING
DESCRIPTORS: ACHING
DESCRIPTORS: THROBBING
DESCRIPTORS: THROBBING
DESCRIPTORS: PRESSURE

## 2024-04-10 ASSESSMENT — PAIN DESCRIPTION - ORIENTATION
ORIENTATION: ANTERIOR
ORIENTATION: ANTERIOR
ORIENTATION: UPPER
ORIENTATION: UPPER

## 2024-04-10 ASSESSMENT — PAIN DESCRIPTION - PAIN TYPE
TYPE: ACUTE PAIN;SURGICAL PAIN
TYPE: ACUTE PAIN;SURGICAL PAIN

## 2024-04-10 ASSESSMENT — LIFESTYLE VARIABLES: SMOKING_STATUS: 0

## 2024-04-10 NOTE — PROGRESS NOTES
Bariatric Surgery                POD #1    /86   Pulse (!) 111   Temp (!) 32 °F (0 °C) (Oral)   Resp 13   Ht 1.6 m (5' 3\")   Wt 82.2 kg (181 lb 4.8 oz)   SpO2 94%   BMI 32.12 kg/m²   Patient has moderate complaints of pain, minimal nausea noted     Exam:  Appears better  Lungs- clear bilaterally  Abd - soft, incisions look good without erythema           LATRICIA with minimal bloody output  Extremities- no new edema or swelling      Data Review:    Labs: Results:       Chemistry No results for input(s): \"GLU\", \"NA\", \"K\", \"CL\", \"CO2\", \"BUN\", \"CREA\", \"CA\", \"AGAP\", \"TP\", \"ALB\", \"GLOB\" in the last 72 hours.    Invalid input(s): \"BUCR\", \"TBIL\", \"GPT\", \"AP\", \"AGRAT\"   CBC w/Diff Recent Labs     04/09/24  2327 04/10/24  0109 04/10/24  0451   WBC 18.6* 20.4* 16.4*   RBC 3.80* 3.57* 4.37   HGB 11.4* 10.6* 13.2   HCT 33.8* 31.9* 39.4    289 231      Coagulation No results for input(s): \"INR\", \"APTT\" in the last 72 hours.    Invalid input(s): \"PTP\"    Liver Enzymes No results for input(s): \"TP\", \"ALB\" in the last 72 hours.    Invalid input(s): \"TBIL\", \"AP\", \"SGOT\", \"GPT\", \"DBIL\"       Assessment/Plan: S/P    Laparoscopy for post op bleeding  - doing well overall    Ice chips only  Will watch in ICU today

## 2024-04-10 NOTE — ACP (ADVANCE CARE PLANNING)
Advance Care Planning   Healthcare Decision Maker:    Primary Decision Maker: Philip Multani - Spouse - [phone]    Patient was not up to completing an AMD but is aware and comfortable that her  Philip (at the bedside) is her legal Medical Decision Maker if needed.    Chaplain Palak Miller M.Div. [facility], [facility]  Board Certified   Board Certified Clinical Ethicist  Certified Advance Care Planner  [phone] - Office      Click here to complete Healthcare Decision Makers including selection of the Healthcare Decision Maker Relationship (ie \"Primary\").

## 2024-04-10 NOTE — CARE COORDINATION
04/10/24 1341   Service Assessment   Patient Orientation Alert and Oriented   Cognition Alert   History Provided By Significant Other;Patient   Primary Caregiver Self   Accompanied By/Relationship Jong White/spouse   Support Systems Spouse/Significant Other   PCP Verified by CM Yes  (Efren Denton)   Last Visit to PCP Within last 3 months   Prior Functional Level Independent in ADLs/IADLs   Current Functional Level Other (see comment)   Can patient return to prior living arrangement Yes   Ability to make needs known: Good   Family able to assist with home care needs: Yes  (plans to have her spouse assit with care at home and drive her home when discharged)   Would you like for me to discuss the discharge plan with any other family members/significant others, and if so, who? Yes  (jong White/spouse)   Financial Resources Medicaid   Community Resources None   CM/SW Referral Other (see comment)   Social/Functional History   Lives With Spouse   Type of Home House   Home Layout One level   ADL Assistance Independent   Ambulation Assistance Independent   Active  Yes   Occupation Unemployed   Discharge Planning   Type of Residence House   Living Arrangements Spouse/Significant Other   Current Services Prior To Admission None   Potential Assistance Needed N/A   DME Ordered? No   Potential Assistance Purchasing Medications No   Type of Home Care Services None   Patient expects to be discharged to: House   One/Two Story Residence One story   Services At/After Discharge   Transition of Care Consult (CM Consult) Discharge Planning   Condition of Participation: Discharge Planning   The Plan for Transition of Care is related to the following treatment goals: To go home     Jodee White  Case Management Department

## 2024-04-10 NOTE — PERIOP NOTE
CBC drawn in pacu.  Drains emptied and recorded, vital signs with in limits denies pain at this time.

## 2024-04-10 NOTE — OP NOTE
[address]  [address]                            OPERATIVE REPORT      PATIENT NAME: LUIS DURAN                  : 1965  MED REC NO: [id number]                       ROOM: 211  ACCOUNT NO: [id number]                       ADMIT DATE: 2024  PROVIDER: Srinath Barreto MD    DATE OF SERVICE:  2024    PREOPERATIVE DIAGNOSES:  Stricture of proximal sleeve, status post sleeve gastrectomy at another institution.    POSTOPERATIVE DIAGNOSES:  Stricture of proximal sleeve, status post sleeve gastrectomy at another institution with:     1. Massive intraabdominal adhesions due to prior surgery.     2. Likely perforation of proximal sleeve at apex of staple line, which was chronic in nature.     3. Retention of the entire upper gastric fundus.     4. Fatty infiltration of liver.    PROCEDURES PERFORMED:       1. Laparoscopic lysis of adhesions at 75 minutes' duration.     2. Conversion of laparoscopic sleeve gastrectomy to laparoscopic gastric bypass procedure with a 150 cm Magan limb bypass in an antecolic antegastric fashion.     3. Partial gastrectomy including the area of chronic perforation.     4. Wedge liver biopsy.     5. Endoscopy.    SURGEON:  Srinath Barreto MD    ASSISTANT:  SHALONDA Dahl assisted with the procedure since no qualified surgeons, interns, or residents available.  He assisted with exposure during the procedure, extensive adhesiolysis, conversion of sleeve gastrectomy to the gastric bypass procedure, partial gastrectomy, closure of skin and fascial incisions.    ANESTHESIA:  General endotracheal.    ESTIMATED BLOOD LOSS:  Less than 50 mL.    SPECIMENS REMOVED:       1. Partial gastrectomy resected specimen.     2. Wedge liver biopsy specimen.    INTRAOPERATIVE FINDINGS:  see dictation     COMPLICATIONS:  None.    IMPLANTS:  None.    INDICATIONS:  This is a patient who had a sleeve

## 2024-04-10 NOTE — OP NOTE
[address]  [address]                            OPERATIVE REPORT      PATIENT NAME: LUIS DURAN                  : 1965  MED REC NO: [id number]                       ROOM: 105  ACCOUNT NO: [id number]                       ADMIT DATE: 2024  PROVIDER: Srinath Barreto MD    DATE OF SERVICE:  04/10/2024    PREOPERATIVE DIAGNOSES:  Postoperative hemorrhage, status post gastric bypass procedure.    POSTOPERATIVE DIAGNOSES:  Postoperative hemorrhage, status post gastric bypass procedure with 2 separate bleeding sites, 1 along the lateral pouch wall, 1 along the Magan limb mesentery.    PROCEDURES PERFORMED:       1. Diagnostic laparoscopy.     2. Evacuation of hematoma, approximately 90 minutes duration.     3. Oversew bleeding sites.    SURGEON:  Srinath Barreto MD    ASSISTANT:  Maryam through the operating room.    ANESTHESIA:  General endotracheal.    ESTIMATED BLOOD LOSS:  Approximately 500 mL of blood was lost during the operation.    SPECIMENS REMOVED:  ***    INTRAOPERATIVE FINDINGS:  ***     COMPLICATIONS:  None.    IMPLANTS:  None.    INDICATIONS:  The patient is a 59-year-old female, who had a gastric bypass procedure earlier today, has conversion from sleeve gastrectomy at another facility.  She had a very complex operation with extensive adhesiolysis and downsizing of her gastric pouch due to the fact that she had some retained fundus and likely contained perforation in the past.  At the termination of her operation, she went to the recovery room, she did fine there, and went to the floor.  Her first set of vitals were normal, and then later that night about 4 hours later, she had vital signs drawn and taken to me, which showed her blood pressure to be markedly reduced with a systolic of 90.  I was notified by the nurse to immediately see the patient, and at the bedside.  She stated she felt normal, and from my  another 30 to 45 minutes in irrigating out the abdomen, checking all areas once again.  Everything was hemostatic and no further bleeding sites and I finally went and obtained a LATRICIA drain placed in the upper abdomen and brought out to be left around the incision.  Both Nu-Knit and Vistaseal were placed along all prior bleeding sites and all trocar sites were then closed using 0 PDS suture and the fascial closure device in addition to Monocryl sutures for skin.  Sterile dressings were applied.  The patient tolerated the procedure well.        MD MARCIANO ROJAS/AQS  D:  04/10/2024 07:42:33  T:  04/10/2024 09:52:22  JOB #:  [id number]

## 2024-04-10 NOTE — ANESTHESIA PRE PROCEDURE
Department of Anesthesiology  Preprocedure Note       Name:  Smita Multani   Age:  59 y.o.  :  1965                                          MRN:  [id number]         Date:  4/10/2024      Surgeon: Surgeon(s):  Srinath Barreto MD    Procedure: Procedure(s):  LAPAROSCOPY DIAGNOSTIC    Medications prior to admission:   Prior to Admission medications    Medication Sig Start Date End Date Taking? Authorizing Provider   estradiol (CLIMARA) 0.0375 MG/24HR Place 1 patch onto the skin once a week Indications: hormone patch- replace every Monday    ProviderUrbano MD   Pediatric Multivit-Minerals (FLINTSTONES GUMMIES PO) Take by mouth Daily    Urbano Dias MD   Probiotic Product (PROBIOTIC DAILY PO) Take by mouth Daily    Urbano Dias MD   ondansetron (ZOFRAN) 4 MG tablet Take 1 tablet by mouth every 8 hours as needed for Nausea or Vomiting  Patient not taking: Reported on 2024  Wilfirdo Leger, PA   acetaminophen (TYLENOL) 500 MG tablet Take 2 tablets by mouth every 6 hours as needed for Pain    ProviderUrbano MD   DULoxetine (CYMBALTA) 20 MG extended release capsule Take 1 capsule by mouth nightly Indications: anxiety & depression    ProviderUrbano MD   valACYclovir (VALTREX) 500 MG tablet Take by mouth as needed    ProviderUrbano MD       Current medications:    Current Facility-Administered Medications   Medication Dose Route Frequency Provider Last Rate Last Admin    0.9 % sodium chloride infusion   IntraVENous PRN Srinath Barreto MD        sodium chloride flush 0.9 % injection 5-40 mL  5-40 mL IntraVENous 2 times per day Srinath Barreto MD   10 mL at 24    sodium chloride flush 0.9 % injection 5-40 mL  5-40 mL IntraVENous PRN Srinath Barreto MD        0.9 % sodium chloride infusion   IntraVENous PRN Srinath Barreto MD        ondansetron (ZOFRAN-ODT) disintegrating tablet 4 mg  4 mg Oral Q8H PRN Srinath Barreto

## 2024-04-10 NOTE — PROGRESS NOTES
Bariatric Surgery                POD #0    BP 91/67   Pulse (!) 135   Temp 98.8 °F (37.1 °C)   Resp 17   Ht 1.6 m (5' 3\")   Wt 82.2 kg (181 lb 4.8 oz)   SpO2 96%   BMI 32.12 kg/m²   Patient had a secondary drop in her blood pressure once again after the 1500 cc fluid bolus.  Repeat CBC revealed a hemoglobin of 10.6.       Exam:  Appears the same  Lungs- clear bilaterally  Abd - soft, incisions look good without erythema           LATRICIA with minimal output          Data Review:    Labs: Results:       Chemistry No results for input(s): \"GLU\", \"NA\", \"K\", \"CL\", \"CO2\", \"BUN\", \"CREA\", \"CA\", \"AGAP\", \"TP\", \"ALB\", \"GLOB\" in the last 72 hours.    Invalid input(s): \"BUCR\", \"TBIL\", \"GPT\", \"AP\", \"AGRAT\"   CBC w/Diff Recent Labs     04/09/24  2327 04/10/24  0109   WBC 18.6* 20.4*   RBC 3.80* 3.57*   HGB 11.4* 10.6*   HCT 33.8* 31.9*    289      Coagulation No results for input(s): \"INR\", \"APTT\" in the last 72 hours.    Invalid input(s): \"PTP\"    Liver Enzymes No results for input(s): \"TP\", \"ALB\" in the last 72 hours.    Invalid input(s): \"TBIL\", \"AP\", \"SGOT\", \"GPT\", \"DBIL\"       Assessment/Plan: S/P   laparoscopic gastric bypass surgery -persistent hypotension.  At this juncture I will go ahead and transfuse her 1 unit of blood to help stabilize her blood pressure.  We will proceed immediately to the operating room for reexploration to try to identify source of bleeding.  I discussed this with the patient and her  and they are in agreement.

## 2024-04-10 NOTE — ANESTHESIA POSTPROCEDURE EVALUATION
Department of Anesthesiology  Postprocedure Note    Patient: Smita Multani  MRN: [id number]  YOB: 1965  Date of evaluation: 4/10/2024    Procedure Summary       Date: 04/10/24 Room / Location: [facility] MAIN 04 / [facility] MAIN OR    Anesthesia Start: 0155 Anesthesia Stop: 0439    Procedure: LAPAROSCOPY DIAGNOSTIC, EVACUATION OF HEMATOMA, LIGATION OFMULTIPLE BLEEDING SITES (Abdomen) Diagnosis:       Bleeding      (Bleeding [R58])    Surgeons: Srinath Barreto MD Responsible Provider: Zain Gonzalez MD    Anesthesia Type: General ASA Status: 2 - Emergent            Anesthesia Type: General    Siomara Phase I: Siomara Score: 8    Siomara Phase II:      Anesthesia Post Evaluation    Patient location during evaluation: PACU  Patient participation: complete - patient participated  Level of consciousness: awake  Airway patency: patent  Nausea & Vomiting: no nausea and no vomiting  Cardiovascular status: hemodynamically stable  Respiratory status: nonlabored ventilation, spontaneous ventilation and nasal cannula  Hydration status: stable  Pain management: adequate    No notable events documented.

## 2024-04-10 NOTE — PROGRESS NOTES
Bariatric Surgery                POD #0    /73   Pulse (!) 114   Temp 98.2 °F (36.8 °C) (Oral)   Resp 15   Ht 1.6 m (5' 3\")   Wt 82.2 kg (181 lb 4.8 oz)   SpO2 96%   BMI 32.12 kg/m²   Patient has minimal complaints of pain.  Called to see the patient due to tachycardia and slightly low blood pressure.  No complaints of dizziness at all.     Exam:  Appears normal in mild pain, awake and oriented  Lungs- clear bilaterally  Abd - soft, incisions look good without erythema           LATRICIA with old blood in drain, minimal output  Extremities- no new edema or swelling        Data Review:    Labs: Results:       Chemistry No results for input(s): \"GLU\", \"NA\", \"K\", \"CL\", \"CO2\", \"BUN\", \"CREA\", \"CA\", \"AGAP\", \"TP\", \"ALB\", \"GLOB\" in the last 72 hours.    Invalid input(s): \"BUCR\", \"TBIL\", \"GPT\", \"AP\", \"AGRAT\"   CBC w/Diff Recent Labs     04/09/24  2327   WBC 18.6*   RBC 3.80*   HGB 11.4*   HCT 33.8*         Coagulation No results for input(s): \"INR\", \"APTT\" in the last 72 hours.    Invalid input(s): \"PTP\"    Liver Enzymes No results for input(s): \"TP\", \"ALB\" in the last 72 hours.    Invalid input(s): \"TBIL\", \"AP\", \"SGOT\", \"GPT\", \"DBIL\"       Assessment/Plan: S/P   laparoscopic gastric bypass surgery -the patient has a slightly low blood pressure and only received 2500 cc of crystalloid during an extremely difficult and long operation.  Likely she has been under resuscitated by at least a liter and 1/2 to 2 L of crystalloid.  In addition she certainly lost some blood intraoperatively and has likely had some additional oozing postoperatively which is a contributing to her tachycardia and lower than normal blood pressure.  Her hemoglobin is 11.4 which is likely a slightly dry value given her under resuscitation.  I feel the best course of action now would be to bolus 100-1500 cc of crystalloid and simply watch her closely.  I have typed and crossed and held 2 units of packed red blood cells for her in the event

## 2024-04-10 NOTE — CONSENT
Informed Consent for Blood Component Transfusion Note    I have discussed with the patient the rationale for blood component transfusion; its benefits in treating or preventing fatigue, organ damage, or death; and its risk which includes mild transfusion reactions, rare risk of blood borne infection, or more serious but rare reactions. I have discussed the alternatives to transfusion, including the risk and consequences of not receiving transfusion. The patient had an opportunity to ask questions and had agreed to proceed with transfusion of blood components.    Electronically signed by Srinath Barreto MD on 4/10/24 at 1:17 AM EDT

## 2024-04-10 NOTE — CONSULTS
27   BUN 10   ANIONGAP 5   CREATININE 0.64   GLUCOSE 161*   CALCIUM 7.8*   PROT 5.1*   LABALBU 2.6*   GLOB 2.5   BILITOT 0.4   ALKPHOS 65   *   *        Ionized Calcium:   No results found for: \"IONCA\"    No results found for: \"IONCA\"  No results for input(s): \"IONCA\" in the last 72 hours.    Magnesium:   No results for input(s): \"MG\" in the last 72 hours.     Phosphorus:   No results for input(s): \"PHOS\" in the last 72 hours.    Amylase, Lipase: No results found for: \"AMYLASE\", \"LIPASE\"  No results for input(s): \"AMYLASE\", \"LIPASE\" in the last 72 hours.    Lipase: No results found for: \"LIPASE\"  No results for input(s): \"LIPASE\" in the last 72 hours.    Ammonis: No results found for: \"AMMONIA\"   No results for input(s): \"AMMONIA\" in the last 72 hours.     Lactic Acid Lactic Acid, Plasma   Date Value Ref Range Status   04/10/2024 1.7 0.4 - 2.0 MMOL/L Final       Recent Labs     04/10/24  1022   LACACIDPL 1.7        CBC w/Diff Recent Labs     04/10/24  0109 04/10/24  0451 04/10/24  1009   WBC 20.4* 16.4* 16.3*   RBC 3.57* 4.37 4.09*   HGB 10.6* 13.2 12.5   HCT 31.9* 39.4 35.4   MCV 89.4 90.2 86.6   MCH 29.7 30.2 30.6   MCHC 33.2 33.5 35.3   RDW 12.2 12.8 12.7    231 217   MPV 9.2 8.7* 9.2         Cardiac Enzymes No results found for: \"CKTOTAL\", \"TROPHS\"    No results for input(s): \"CKTOTAL\" in the last 72 hours.    Invalid input(s): \"TROPOHS\"     BNP No results found for: \"NTPROBNP\"    No results for input(s): \"NTPROBNP\" in the last 72 hours.     Coagulation INR   Date/Time Value Ref Range Status   04/10/2024 10:09 AM 1.1 0.9 - 1.1   Final     Comment:                INR Therapeutic Ranges         (on stable oral anticoagulant):     INDICATION                INR  DVT/PE/Atrial Fib          2.0-3.0  MI/Mechanical Heart Valve  2.5-3.5       APTT   Date/Time Value Ref Range Status   04/10/2024 10:09 AM 29.1 23.0 - 36.4 SEC Final       Recent Labs     04/10/24  1009   INR 1.1   APTT 29.1             XR CHEST 1 VIEW    Result Date: 4/10/2024  Left basilar atelectasis.          ·Please note: Voice-recognition software may have been used to generate this report, which may have resulted in some phonetic-based errors in grammar and contents. Even though attempts were made to correct all the mistakes, some may have been missed, and remained in the body of the document.    Manny Alfaro MD  4/10/2024

## 2024-04-10 NOTE — PERIOP NOTE
TRANSFER - OUT REPORT:    Verbal report given to OG Garcia on Smita Multani  being transferred to ICU #5 for routine post-op       Report consisted of patient's Situation, Background, Assessment and   Recommendations(SBAR).     Information from the following report(s) Nurse Handoff Report was reviewed with the receiving nurse.           Lines:   Peripheral IV 04/09/24 Posterior;Right Hand (Active)   Site Assessment Clean, dry & intact 04/10/24 0502   Line Status Infusing 04/10/24 0502   Line Care Connections checked and tightened 04/10/24 0502   Phlebitis Assessment No symptoms 04/10/24 0502   Infiltration Assessment 0 04/10/24 0502   Alcohol Cap Used No 04/10/24 0439   Dressing Status Clean, dry & intact 04/10/24 0439   Dressing Type Transparent 04/10/24 0439        Opportunity for questions and clarification was provided.      Patient transported with:  Monitor, O2 @ 2lpm, and Registered Nurse

## 2024-04-10 NOTE — PERIOP NOTE
TRANSFER - IN REPORT:    Verbal report received from ORN & CRNA on Smita Multani  being received from OR  for routine post-op      Report consisted of patient's Situation, Background, Assessment and   Recommendations(SBAR).     Information from the following report(s) Nurse Handoff Report was reviewed with the receiving nurse.    Opportunity for questions and clarification was provided.      Assessment completed upon patient's arrival to unit and care assumed.

## 2024-04-11 LAB
ANION GAP SERPL CALC-SCNC: 2 MMOL/L (ref 3–18)
BASOPHILS # BLD: 0 K/UL (ref 0–0.1)
BASOPHILS NFR BLD: 0 % (ref 0–2)
BUN SERPL-MCNC: 9 MG/DL (ref 7–18)
BUN/CREAT SERPL: 16 (ref 12–20)
CALCIUM SERPL-MCNC: 8.2 MG/DL (ref 8.5–10.1)
CHLORIDE SERPL-SCNC: 105 MMOL/L (ref 100–111)
CO2 SERPL-SCNC: 29 MMOL/L (ref 21–32)
CREAT SERPL-MCNC: 0.55 MG/DL (ref 0.6–1.3)
DIFFERENTIAL METHOD BLD: ABNORMAL
EOSINOPHIL # BLD: 0 K/UL (ref 0–0.4)
EOSINOPHIL NFR BLD: 0 % (ref 0–5)
ERYTHROCYTE [DISTWIDTH] IN BLOOD BY AUTOMATED COUNT: 13.1 % (ref 11.6–14.5)
GLUCOSE SERPL-MCNC: 130 MG/DL (ref 74–99)
HCT VFR BLD AUTO: 31.8 % (ref 35–45)
HGB BLD-MCNC: 10.7 G/DL (ref 12–16)
IMM GRANULOCYTES # BLD AUTO: 0.1 K/UL (ref 0–0.04)
IMM GRANULOCYTES NFR BLD AUTO: 1 % (ref 0–0.5)
LYMPHOCYTES # BLD: 2 K/UL (ref 0.9–3.6)
LYMPHOCYTES NFR BLD: 12 % (ref 21–52)
MCH RBC QN AUTO: 30.1 PG (ref 24–34)
MCHC RBC AUTO-ENTMCNC: 33.6 G/DL (ref 31–37)
MCV RBC AUTO: 89.3 FL (ref 78–100)
MONOCYTES # BLD: 1.3 K/UL (ref 0.05–1.2)
MONOCYTES NFR BLD: 8 % (ref 3–10)
NEUTS SEG # BLD: 13.6 K/UL (ref 1.8–8)
NEUTS SEG NFR BLD: 80 % (ref 40–73)
NRBC # BLD: 0 K/UL (ref 0–0.01)
NRBC BLD-RTO: 0 PER 100 WBC
PLATELET # BLD AUTO: 193 K/UL (ref 135–420)
PMV BLD AUTO: 8.9 FL (ref 9.2–11.8)
POTASSIUM SERPL-SCNC: 4.3 MMOL/L (ref 3.5–5.5)
RBC # BLD AUTO: 3.56 M/UL (ref 4.2–5.3)
SODIUM SERPL-SCNC: 136 MMOL/L (ref 136–145)
WBC # BLD AUTO: 17 K/UL (ref 4.6–13.2)

## 2024-04-11 PROCEDURE — C9113 INJ PANTOPRAZOLE SODIUM, VIA: HCPCS | Performed by: SPECIALIST

## 2024-04-11 PROCEDURE — 85025 COMPLETE CBC W/AUTO DIFF WBC: CPT

## 2024-04-11 PROCEDURE — 80048 BASIC METABOLIC PNL TOTAL CA: CPT

## 2024-04-11 PROCEDURE — 2700000000 HC OXYGEN THERAPY PER DAY

## 2024-04-11 PROCEDURE — A4216 STERILE WATER/SALINE, 10 ML: HCPCS | Performed by: SPECIALIST

## 2024-04-11 PROCEDURE — 6360000002 HC RX W HCPCS: Performed by: INTERNAL MEDICINE

## 2024-04-11 PROCEDURE — 2000000000 HC ICU R&B

## 2024-04-11 PROCEDURE — 6360000002 HC RX W HCPCS: Performed by: SPECIALIST

## 2024-04-11 PROCEDURE — 2580000003 HC RX 258: Performed by: SPECIALIST

## 2024-04-11 RX ORDER — FUROSEMIDE 10 MG/ML
20 INJECTION INTRAMUSCULAR; INTRAVENOUS ONCE
Status: COMPLETED | OUTPATIENT
Start: 2024-04-11 | End: 2024-04-11

## 2024-04-11 RX ORDER — FUROSEMIDE 10 MG/ML
20 INJECTION INTRAMUSCULAR; INTRAVENOUS ONCE
Status: DISCONTINUED | OUTPATIENT
Start: 2024-04-11 | End: 2024-04-11 | Stop reason: ALTCHOICE

## 2024-04-11 RX ADMIN — HYDROMORPHONE HYDROCHLORIDE 0.5 MG: 1 INJECTION, SOLUTION INTRAMUSCULAR; INTRAVENOUS; SUBCUTANEOUS at 19:59

## 2024-04-11 RX ADMIN — SODIUM CHLORIDE, SODIUM LACTATE, POTASSIUM CHLORIDE, AND CALCIUM CHLORIDE: 600; 310; 30; 20 INJECTION, SOLUTION INTRAVENOUS at 03:15

## 2024-04-11 RX ADMIN — HYDROMORPHONE HYDROCHLORIDE 0.5 MG: 1 INJECTION, SOLUTION INTRAMUSCULAR; INTRAVENOUS; SUBCUTANEOUS at 08:16

## 2024-04-11 RX ADMIN — METOCLOPRAMIDE 10 MG: 5 INJECTION, SOLUTION INTRAMUSCULAR; INTRAVENOUS at 01:57

## 2024-04-11 RX ADMIN — PANTOPRAZOLE SODIUM 40 MG: 40 INJECTION, POWDER, FOR SOLUTION INTRAVENOUS at 01:51

## 2024-04-11 RX ADMIN — HYDROMORPHONE HYDROCHLORIDE 0.5 MG: 1 INJECTION, SOLUTION INTRAMUSCULAR; INTRAVENOUS; SUBCUTANEOUS at 23:02

## 2024-04-11 RX ADMIN — SODIUM CHLORIDE, PRESERVATIVE FREE 10 ML: 5 INJECTION INTRAVENOUS at 08:20

## 2024-04-11 RX ADMIN — METOCLOPRAMIDE 10 MG: 5 INJECTION, SOLUTION INTRAMUSCULAR; INTRAVENOUS at 08:16

## 2024-04-11 RX ADMIN — HYDROMORPHONE HYDROCHLORIDE 0.5 MG: 1 INJECTION, SOLUTION INTRAMUSCULAR; INTRAVENOUS; SUBCUTANEOUS at 14:16

## 2024-04-11 RX ADMIN — METOCLOPRAMIDE 10 MG: 5 INJECTION, SOLUTION INTRAMUSCULAR; INTRAVENOUS at 14:16

## 2024-04-11 RX ADMIN — SODIUM CHLORIDE, PRESERVATIVE FREE 10 ML: 5 INJECTION INTRAVENOUS at 20:00

## 2024-04-11 RX ADMIN — HYDROMORPHONE HYDROCHLORIDE 0.5 MG: 1 INJECTION, SOLUTION INTRAMUSCULAR; INTRAVENOUS; SUBCUTANEOUS at 04:13

## 2024-04-11 RX ADMIN — FUROSEMIDE 20 MG: 10 INJECTION, SOLUTION INTRAMUSCULAR; INTRAVENOUS at 10:29

## 2024-04-11 RX ADMIN — METOCLOPRAMIDE 10 MG: 5 INJECTION, SOLUTION INTRAMUSCULAR; INTRAVENOUS at 20:01

## 2024-04-11 ASSESSMENT — PAIN DESCRIPTION - LOCATION
LOCATION: ABDOMEN;INCISION
LOCATION: ABDOMEN
LOCATION: ABDOMEN;GENERALIZED;INCISION
LOCATION: ABDOMEN;INCISION;BACK
LOCATION: ABDOMEN
LOCATION: ABDOMEN;INCISION

## 2024-04-11 ASSESSMENT — PAIN SCALES - GENERAL
PAINLEVEL_OUTOF10: 5
PAINLEVEL_OUTOF10: 8
PAINLEVEL_OUTOF10: 9
PAINLEVEL_OUTOF10: 10
PAINLEVEL_OUTOF10: 6
PAINLEVEL_OUTOF10: 1
PAINLEVEL_OUTOF10: 0
PAINLEVEL_OUTOF10: 8
PAINLEVEL_OUTOF10: 2
PAINLEVEL_OUTOF10: 4
PAINLEVEL_OUTOF10: 0
PAINLEVEL_OUTOF10: 5

## 2024-04-11 ASSESSMENT — PAIN DESCRIPTION - ORIENTATION
ORIENTATION: UPPER
ORIENTATION: ANTERIOR

## 2024-04-11 ASSESSMENT — PAIN DESCRIPTION - PAIN TYPE
TYPE: ACUTE PAIN;SURGICAL PAIN
TYPE: ACUTE PAIN;SURGICAL PAIN

## 2024-04-11 ASSESSMENT — PAIN DESCRIPTION - DESCRIPTORS
DESCRIPTORS: ACHING;DISCOMFORT
DESCRIPTORS: ACHING
DESCRIPTORS: ACHING;PRESSURE
DESCRIPTORS: DISCOMFORT

## 2024-04-11 ASSESSMENT — PAIN DESCRIPTION - FREQUENCY: FREQUENCY: INTERMITTENT

## 2024-04-11 NOTE — PROGRESS NOTES
Spoke with Will in pharmacy regarding Lasix and discussed with him that patient has used it for diuresis with no adverse reaction. Allergy list updated to reflect this. He stated that Dr Barreto would have to reorder since he had advised to hold for contraindication. Dr Barreto msg'd.    [id number]: Dr Barreto reorder lasix

## 2024-04-11 NOTE — PROGRESS NOTES
Bariatric Surgery                POD #2 (pt seen in ICU)    /79   Pulse (!) 123   Temp 98.5 °F (36.9 °C) (Oral)   Resp 18   Ht 1.6 m (5' 3\")   Wt 82.2 kg (181 lb 4.8 oz)   SpO2 94%   BMI 32.12 kg/m²   Patient has minimal complaints of pain, minimal nausea noted     Exam:  Appears well in no distress  Lungs- clear bilaterally  Abd - soft, incisions look good without erythema           LATRICIA with minimal serosanguinous output  Extremities- no new edema or swelling      Data Review:    Labs: Results:       Chemistry Recent Labs     04/10/24  1009 04/11/24  0420    136   K 3.9 4.3    105   CO2 27 29   BUN 10 9   GLOB 2.5  --       CBC w/Diff Recent Labs     04/10/24  0451 04/10/24  1009 04/11/24  0420   WBC 16.4* 16.3* 17.0*   RBC 4.37 4.09* 3.56*   HGB 13.2 12.5 10.7*   HCT 39.4 35.4 31.8*    217 193      Coagulation Recent Labs     04/10/24  1009   INR 1.1   APTT 29.1       Liver Enzymes No results for input(s): \"TP\", \"ALB\" in the last 72 hours.    Invalid input(s): \"TBIL\", \"AP\", \"SGOT\", \"GPT\", \"DBIL\"       Assessment/Plan: S/P  sleeve to bypass conversion with need for OR take back within 12 hours for post-op bleed.  Labs as above    KVO IV Fluids  Needs intense IS use to improve O2 sats  Bariatric clears diet  Single dose of lasix

## 2024-04-11 NOTE — NURSE NAVIGATOR
Patient's  at bedside.  Patient up in chair and alert throughout visit.  Patient denied pain and/or nausea.        /84   Pulse (!) 119   Temp 98.5 °F (36.9 °C) (Oral)   Resp 17   Ht 1.6 m (5' 3\")   Wt 82.2 kg (181 lb 4.8 oz)   SpO2 94%   BMI 32.12 kg/m²     General: Alert & oriented to person, place, time, and situation  Abdomen: Appropriate tenderness, soft, non-distended  Lap sites: Within normal limits  LATRICIA Drain: Scant amount of maroon drainage  Duron catheter: 800 cc clear, yellow urine  SCD's: In place and operating within normal limits    Plan:  -Continue medications for symptom management  -Encourage ambulation  -SCD use when in bed  -IS 10 times an hour  -Clear liquid diet    Plan was discussed with the patient, as well as IS teaching provided.  Patient verbalized understanding to plan and education.  Patient completed IS x3 during this visit with no issues nor concerns noted by this RN.  She reached 1,00 mL.

## 2024-04-11 NOTE — PROGRESS NOTES
was using CPAP but not lately, s/p gastric sleeve surgery about 9 years ago, underwent laparoscopic adhesiolysis and conversion of sleeve gastrectomy to laparoscopic gastric bypass surgery and liver wedge biopsy on 4/9/2024; postoperatively patient had bleeding at surgical site and underwent repeat surgery with 2 separate bleeder ligation.    Received 2 PRBC in OR/PACU.  Hemodynamically stable.  Not on any vasopressors.  Patient was transferred to ICU for close monitoring of hemodynamics.    4/11/2024 :     Remains in ICU room 105.  Sitting in the chair.  O2 2 LPM NC.  Using incentive spirometer up to 500 to 700 mL.  Mild tachycardia.  Blood pressure stable.  Hemoglobin low but no external bleeding.  No bleeding in the LATRICIA drain.  No fever, dyspnea, wheezing, chest pain.  Mild abdominal pain at the surgical site.  Seen by Dr. Barreto and started on bariatric clear liquid diet, reduce IV fluid to 10 mL/h and Lasix 20 mg IV x 1.  Pharmacy thought patient is allergic to sulfa drugs but patient reported that she is using Lasix as needed at home and she is not allergic to Lasix.  Pharmacy notified.  No other overnight events reported.        I/O last 24 hrs:   Intake/Output Summary (Last 24 hours) at 4/11/2024 0943  Last data filed at 4/11/2024 0800  Gross per 24 hour   Intake 1822 ml   Output 2230 ml   Net -408 ml           The patient is critically ill   History taken from patient,  at bedside, EMR     Review of Systems:   HEENT: No epistaxis, no nasal drainage, no difficulty in swallowing, no redness in eyes  Respiratory: as above  Cardiovascular: no chest pain, no palpitations, no chronic leg edema, no syncope  Gastrointestinal: As above.  Genitourinary: No urinary symptoms or hematuria  Musculoskeletal: Neg  Neurological: No focal weakness, no seizures, no headaches  Behvioral/Psych: No anxiety, no depression  Constitutional: No fever, no chills, no weight loss, no night sweats     Allergies   Allergen    CBC with Auto Differential    Collection Time: 04/11/24  4:20 AM   Result Value Ref Range    WBC 17.0 (H) 4.6 - 13.2 K/uL    RBC 3.56 (L) 4.20 - 5.30 M/uL    Hemoglobin 10.7 (L) 12.0 - 16.0 g/dL    Hematocrit 31.8 (L) 35.0 - 45.0 %    MCV 89.3 78.0 - 100.0 FL    MCH 30.1 24.0 - 34.0 PG    MCHC 33.6 31.0 - 37.0 g/dL    RDW 13.1 11.6 - 14.5 %    Platelets 193 135 - 420 K/uL    MPV 8.9 (L) 9.2 - 11.8 FL    Nucleated RBCs 0.0 0  WBC    nRBC 0.00 0.00 - 0.01 K/uL    Neutrophils % 80 (H) 40 - 73 %    Lymphocytes % 12 (L) 21 - 52 %    Monocytes % 8 3 - 10 %    Eosinophils % 0 0 - 5 %    Basophils % 0 0 - 2 %    Immature Granulocytes % 1 (H) 0.0 - 0.5 %    Neutrophils Absolute 13.6 (H) 1.8 - 8.0 K/UL    Lymphocytes Absolute 2.0 0.9 - 3.6 K/UL    Monocytes Absolute 1.3 (H) 0.05 - 1.2 K/UL    Eosinophils Absolute 0.0 0.0 - 0.4 K/UL    Basophils Absolute 0.0 0.0 - 0.1 K/UL    Immature Granulocytes Absolute 0.1 (H) 0.00 - 0.04 K/UL    Differential Type AUTOMATED     Basic Metabolic Panel    Collection Time: 04/11/24  4:20 AM   Result Value Ref Range    Sodium 136 136 - 145 mmol/L    Potassium 4.3 3.5 - 5.5 mmol/L    Chloride 105 100 - 111 mmol/L    CO2 29 21 - 32 mmol/L    Anion Gap 2 (L) 3.0 - 18 mmol/L    Glucose 130 (H) 74 - 99 mg/dL    BUN 9 7.0 - 18 MG/DL    Creatinine 0.55 (L) 0.6 - 1.3 MG/DL    Bun/Cre Ratio 16 12 - 20      Est, Glom Filt Rate >90 >60 ml/min/1.73m2    Calcium 8.2 (L) 8.5 - 10.1 MG/DL           ABG No results for input(s): \"MODE\", \"POCTV\", \"POCFIO2\", \"POCPEEPCPA\", \"PHAPOC\", \"KHS6BZHR\", \"LX7QHBC\", \"TG3LULVY\", \"SIG0IWA\" in the last 72 hours.       CMP Recent Labs     04/10/24  1009 04/11/24  0420    136   K 3.9 4.3    105   CO2 27 29   BUN 10 9   ANIONGAP 5 2*   CREATININE 0.64 0.55*   GLUCOSE 161* 130*   CALCIUM 7.8* 8.2*   PROT 5.1*  --    LABALBU 2.6*  --    GLOB 2.5  --    BILITOT 0.4  --    ALKPHOS 65  --    *  --    *  --           Ionized Calcium:   No results found

## 2024-04-12 ENCOUNTER — APPOINTMENT (OUTPATIENT)
Facility: HOSPITAL | Age: 59
End: 2024-04-12
Attending: SPECIALIST
Payer: MEDICAID

## 2024-04-12 LAB
ANION GAP SERPL CALC-SCNC: 5 MMOL/L (ref 3–18)
BASOPHILS # BLD: 0.1 K/UL (ref 0–0.1)
BASOPHILS NFR BLD: 0 % (ref 0–2)
BUN SERPL-MCNC: 9 MG/DL (ref 7–18)
BUN/CREAT SERPL: 23 (ref 12–20)
CALCIUM SERPL-MCNC: 8.3 MG/DL (ref 8.5–10.1)
CHLORIDE SERPL-SCNC: 104 MMOL/L (ref 100–111)
CO2 SERPL-SCNC: 27 MMOL/L (ref 21–32)
CREAT SERPL-MCNC: 0.39 MG/DL (ref 0.6–1.3)
DIFFERENTIAL METHOD BLD: ABNORMAL
EOSINOPHIL # BLD: 0.3 K/UL (ref 0–0.4)
EOSINOPHIL NFR BLD: 2 % (ref 0–5)
ERYTHROCYTE [DISTWIDTH] IN BLOOD BY AUTOMATED COUNT: 12.9 % (ref 11.6–14.5)
GLUCOSE SERPL-MCNC: 97 MG/DL (ref 74–99)
HCT VFR BLD AUTO: 30.7 % (ref 35–45)
HGB BLD-MCNC: 10.3 G/DL (ref 12–16)
IMM GRANULOCYTES # BLD AUTO: 0.1 K/UL (ref 0–0.04)
IMM GRANULOCYTES NFR BLD AUTO: 1 % (ref 0–0.5)
LYMPHOCYTES # BLD: 2.6 K/UL (ref 0.9–3.6)
LYMPHOCYTES NFR BLD: 21 % (ref 21–52)
MCH RBC QN AUTO: 30.3 PG (ref 24–34)
MCHC RBC AUTO-ENTMCNC: 33.6 G/DL (ref 31–37)
MCV RBC AUTO: 90.3 FL (ref 78–100)
MONOCYTES # BLD: 1.2 K/UL (ref 0.05–1.2)
MONOCYTES NFR BLD: 10 % (ref 3–10)
NEUTS SEG # BLD: 8.2 K/UL (ref 1.8–8)
NEUTS SEG NFR BLD: 66 % (ref 40–73)
NRBC # BLD: 0 K/UL (ref 0–0.01)
NRBC BLD-RTO: 0 PER 100 WBC
PLATELET # BLD AUTO: 246 K/UL (ref 135–420)
PMV BLD AUTO: 9.1 FL (ref 9.2–11.8)
POTASSIUM SERPL-SCNC: 3.8 MMOL/L (ref 3.5–5.5)
RBC # BLD AUTO: 3.4 M/UL (ref 4.2–5.3)
SODIUM SERPL-SCNC: 136 MMOL/L (ref 136–145)
WBC # BLD AUTO: 12.5 K/UL (ref 4.6–13.2)

## 2024-04-12 PROCEDURE — 80048 BASIC METABOLIC PNL TOTAL CA: CPT

## 2024-04-12 PROCEDURE — 6370000000 HC RX 637 (ALT 250 FOR IP): Performed by: SURGERY

## 2024-04-12 PROCEDURE — C9113 INJ PANTOPRAZOLE SODIUM, VIA: HCPCS | Performed by: SPECIALIST

## 2024-04-12 PROCEDURE — A4216 STERILE WATER/SALINE, 10 ML: HCPCS | Performed by: SPECIALIST

## 2024-04-12 PROCEDURE — 1100000000 HC RM PRIVATE

## 2024-04-12 PROCEDURE — 97161 PT EVAL LOW COMPLEX 20 MIN: CPT

## 2024-04-12 PROCEDURE — 85025 COMPLETE CBC W/AUTO DIFF WBC: CPT

## 2024-04-12 PROCEDURE — 2580000003 HC RX 258: Performed by: SURGERY

## 2024-04-12 PROCEDURE — 2580000003 HC RX 258: Performed by: SPECIALIST

## 2024-04-12 PROCEDURE — 2700000000 HC OXYGEN THERAPY PER DAY

## 2024-04-12 PROCEDURE — 6360000002 HC RX W HCPCS: Performed by: SPECIALIST

## 2024-04-12 PROCEDURE — 74176 CT ABD & PELVIS W/O CONTRAST: CPT

## 2024-04-12 RX ORDER — ACETAMINOPHEN 325 MG/1
650 TABLET ORAL EVERY 6 HOURS PRN
Status: DISCONTINUED | OUTPATIENT
Start: 2024-04-12 | End: 2024-04-13 | Stop reason: HOSPADM

## 2024-04-12 RX ORDER — SODIUM CHLORIDE, SODIUM LACTATE, POTASSIUM CHLORIDE, CALCIUM CHLORIDE 600; 310; 30; 20 MG/100ML; MG/100ML; MG/100ML; MG/100ML
INJECTION, SOLUTION INTRAVENOUS CONTINUOUS
Status: DISCONTINUED | OUTPATIENT
Start: 2024-04-12 | End: 2024-04-12

## 2024-04-12 RX ADMIN — PANTOPRAZOLE SODIUM 40 MG: 40 INJECTION, POWDER, FOR SOLUTION INTRAVENOUS at 02:04

## 2024-04-12 RX ADMIN — HYDROMORPHONE HYDROCHLORIDE 0.5 MG: 1 INJECTION, SOLUTION INTRAMUSCULAR; INTRAVENOUS; SUBCUTANEOUS at 09:48

## 2024-04-12 RX ADMIN — SODIUM CHLORIDE, PRESERVATIVE FREE 10 ML: 5 INJECTION INTRAVENOUS at 08:41

## 2024-04-12 RX ADMIN — METOCLOPRAMIDE 10 MG: 5 INJECTION, SOLUTION INTRAMUSCULAR; INTRAVENOUS at 20:46

## 2024-04-12 RX ADMIN — HYDROMORPHONE HYDROCHLORIDE 0.5 MG: 1 INJECTION, SOLUTION INTRAMUSCULAR; INTRAVENOUS; SUBCUTANEOUS at 13:17

## 2024-04-12 RX ADMIN — SODIUM CHLORIDE, PRESERVATIVE FREE 10 ML: 5 INJECTION INTRAVENOUS at 20:47

## 2024-04-12 RX ADMIN — ACETAMINOPHEN 650 MG: 325 TABLET ORAL at 18:21

## 2024-04-12 RX ADMIN — METOCLOPRAMIDE 10 MG: 5 INJECTION, SOLUTION INTRAMUSCULAR; INTRAVENOUS at 14:10

## 2024-04-12 RX ADMIN — PANTOPRAZOLE SODIUM 40 MG: 40 INJECTION, POWDER, FOR SOLUTION INTRAVENOUS at 23:53

## 2024-04-12 RX ADMIN — METOCLOPRAMIDE 10 MG: 5 INJECTION, SOLUTION INTRAMUSCULAR; INTRAVENOUS at 08:41

## 2024-04-12 RX ADMIN — HYDROMORPHONE HYDROCHLORIDE 0.5 MG: 1 INJECTION, SOLUTION INTRAMUSCULAR; INTRAVENOUS; SUBCUTANEOUS at 05:03

## 2024-04-12 RX ADMIN — METOCLOPRAMIDE 10 MG: 5 INJECTION, SOLUTION INTRAMUSCULAR; INTRAVENOUS at 02:04

## 2024-04-12 RX ADMIN — HYDROMORPHONE HYDROCHLORIDE 0.5 MG: 1 INJECTION, SOLUTION INTRAMUSCULAR; INTRAVENOUS; SUBCUTANEOUS at 23:50

## 2024-04-12 RX ADMIN — HYDROMORPHONE HYDROCHLORIDE 0.5 MG: 1 INJECTION, SOLUTION INTRAMUSCULAR; INTRAVENOUS; SUBCUTANEOUS at 16:58

## 2024-04-12 RX ADMIN — SODIUM CHLORIDE, POTASSIUM CHLORIDE, SODIUM LACTATE AND CALCIUM CHLORIDE: 600; 310; 30; 20 INJECTION, SOLUTION INTRAVENOUS at 09:47

## 2024-04-12 ASSESSMENT — PAIN SCALES - GENERAL
PAINLEVEL_OUTOF10: 6
PAINLEVEL_OUTOF10: 2
PAINLEVEL_OUTOF10: 5
PAINLEVEL_OUTOF10: 2
PAINLEVEL_OUTOF10: 3
PAINLEVEL_OUTOF10: 6
PAINLEVEL_OUTOF10: 7
PAINLEVEL_OUTOF10: 5
PAINLEVEL_OUTOF10: 6
PAINLEVEL_OUTOF10: 6
PAINLEVEL_OUTOF10: 7
PAINLEVEL_OUTOF10: 5
PAINLEVEL_OUTOF10: 4

## 2024-04-12 ASSESSMENT — PAIN DESCRIPTION - DESCRIPTORS
DESCRIPTORS: DISCOMFORT
DESCRIPTORS: CRAMPING
DESCRIPTORS: SORE
DESCRIPTORS: ACHING;DISCOMFORT
DESCRIPTORS: ACHING;DISCOMFORT
DESCRIPTORS: ACHING
DESCRIPTORS: ACHING
DESCRIPTORS: ACHING;DISCOMFORT
DESCRIPTORS: CRAMPING

## 2024-04-12 ASSESSMENT — PAIN DESCRIPTION - LOCATION
LOCATION: ABDOMEN
LOCATION: BACK

## 2024-04-12 ASSESSMENT — PAIN - FUNCTIONAL ASSESSMENT
PAIN_FUNCTIONAL_ASSESSMENT: ACTIVITIES ARE NOT PREVENTED
PAIN_FUNCTIONAL_ASSESSMENT: PREVENTS OR INTERFERES SOME ACTIVE ACTIVITIES AND ADLS
PAIN_FUNCTIONAL_ASSESSMENT: PREVENTS OR INTERFERES SOME ACTIVE ACTIVITIES AND ADLS
PAIN_FUNCTIONAL_ASSESSMENT: ACTIVITIES ARE NOT PREVENTED

## 2024-04-12 ASSESSMENT — PAIN DESCRIPTION - ORIENTATION
ORIENTATION: ANTERIOR
ORIENTATION: ANTERIOR
ORIENTATION: RIGHT;LEFT
ORIENTATION: MID
ORIENTATION: ANTERIOR
ORIENTATION: RIGHT;LEFT
ORIENTATION: ANTERIOR

## 2024-04-12 ASSESSMENT — PAIN DESCRIPTION - ONSET: ONSET: ON-GOING

## 2024-04-12 ASSESSMENT — PAIN DESCRIPTION - PAIN TYPE
TYPE: ACUTE PAIN
TYPE: CHRONIC PAIN

## 2024-04-12 ASSESSMENT — PAIN DESCRIPTION - FREQUENCY: FREQUENCY: INTERMITTENT

## 2024-04-12 NOTE — PROGRESS NOTES
Clinical update-  Addendum-update post CT  CT swallow done and no leak seen on official reading. Contrast moves through GJ and distal to JJ without extravasation. LATRICIA drain in place and no contrast in tubing. Will resume bariatric clear diet, encourage ICS and ambulation. D/w kathleen Garcia to transfer to the floor. Plan discussed with pt and her .

## 2024-04-12 NOTE — PROGRESS NOTES
Progress Note  Date:2024       Room:[facility]  Patient Name:Smita Multani     YOB: 1965     Age:59 y.o.        Subjective    Subjective Pt reports she is doing better, passing gas, tolerating sips of water. She reports some pain in upper abdomen. She reports breathing better with ICS to 1000. She has not walked today. She describes soreness in abdomen, but has not received pain meds. Denies nausea.   Review of Systems  Objective         Vitals Last 24 Hours:  TEMPERATURE:  Temp  Av.7 °F (37.1 °C)  Min: 97.8 °F (36.6 °C)  Max: 99.5 °F (37.5 °C)  RESPIRATIONS RANGE: Resp  Avg: 15.6  Min: 11  Max: 24  PULSE OXIMETRY RANGE: SpO2  Av.5 %  Min: 94 %  Max: 99 %  PULSE RANGE: Pulse  Av  Min: 79  Max: 127  BLOOD PRESSURE RANGE: Systolic (24hrs), Av , Min:108 , Max:142   ; Diastolic (24hrs), Av, Min:63, Max:87    I/O (24Hr):    Intake/Output Summary (Last 24 hours) at 2024 0919  Last data filed at 2024 0553  Gross per 24 hour   Intake 717.5 ml   Output 2880 ml   Net -2162.5 ml     Objective PEAfebrile,  on monitor, BP stable  Awake, alert, sitting up in chair  Lungs clear but decreased at bases olvin  Cor tachy  Abd soft, obese, mild midepigastric and incision tenderness. LATRICIA drain 80ml yesterday. Color is dark purple similar to grape color of patient's water    Labs/Imaging/Diagnostics    Labs:  CBC:  Recent Labs     04/10/24  1009 24  0420 24  0538   WBC 16.3* 17.0* 12.5   RBC 4.09* 3.56* 3.40*   HGB 12.5 10.7* 10.3*   HCT 35.4 31.8* 30.7*   MCV 86.6 89.3 90.3   RDW 12.7 13.1 12.9    193 246     CHEMISTRIES:  Recent Labs     04/10/24  1009 24  0420 24  0538    136 136   K 3.9 4.3 3.8    105 104   CO2 27 29 27   BUN 10 9 9   CREATININE 0.64 0.55* 0.39*   GLUCOSE 161* 130* 97     PT/INR:  Recent Labs     04/10/24  1009   PROTIME 14.1   INR 1.1     APTT:  Recent Labs     04/10/24  1009   APTT 29.1     LIVER PROFILE:  Recent Labs      04/10/24  1009   *   *   BILITOT 0.4   ALKPHOS 65       Imaging Last 24 Hours:  No results found.  Assessment//Plan           Hospital Problems             Last Modified POA    * (Principal) Chronic GERD 4/9/2024 Yes    Hourglass stricture of stomach 4/9/2024 Yes    Bleeding 4/10/2024 Yes     Assessment & Plan  A:POD#3 s/p revision of LSG to LRYGB for reflux with ROR for post op bleeding. Hgb stable, WBC trending down, but now drain color is similar to what the patient has been sipping. This may be old blood mixed with the fibrant sealant used intra-op at the second operation but need to rule out leak.   NPO now, order stat gastrograffin study to eval for leak  Resume IVF until we have ruled out leak  Keep in ICU for now. Encourage ICS. Ambulation  Cont daily PPI  Cont pain control with prn dilaudid  Case d/w radiology who recommends CT swallow as more sensitive study  Electronically signed by Anaya Harley MD on 4/12/24 at 9:19 AM EDT

## 2024-04-12 NOTE — PROGRESS NOTES
Physical Therapy Goals:  Initiated 4/12/2024 to be met within 7-10 days.  Short Term Goals  Short Term Goal 1: Patient will perform supine to/from sit with supervision.  Short Term Goal 2: Patient will perform sit to/from stand with S/RW in prep for ambulation activity.  Short Term Goal 3: Patient will perform ambulation 300 ft with S/RW for increased functional mobility.  Short Term Goal 4: Patient will perform step nego x3-5 with min/CGA  for home re-entry.      PHYSICAL THERAPY EVALUATION    Patient: Smita Multani (59 y.o. female)  Date: 4/12/2024  Primary Diagnosis: Gastroesophageal reflux disease with esophagitis, unspecified whether hemorrhage [K21.00]  Bariatric surgery status [Z98.84]  Chronic GERD [K21.9]  Hourglass stricture of stomach [K31.2]  Procedure(s) (LRB):  LAPAROSCOPY DIAGNOSTIC, EVACUATION OF HEMATOMA, LIGATION OFMULTIPLE BLEEDING SITES (N/A) 2 Days Post-Op   Precautions:   PLOF: independent ambulation w/o AD PTA.  Lives with spouse (who pt states will assist at discharge) in [facility] with 3 AUGUSTINE, no HR's.    ASSESSMENT :  Based on the objective data described below, the patient presents with decreased independence in functional mobility with regard to bed mobility and transfers, with gait, limited primarily by abdominal pain s/p surgery as noted above.  Pt found seated EOB and reports pain 8-9/10 9, requesting pain medication, but willing to participate with therapy session. and decreased activity tolerance.  LATRICIA drain in place abdomen.  Patient sit to stand  with supervision and able to participate with GT/RW/S/SBA ~250ft.   Antalgic gait  with reciprocal gt pattern.  Verbal cues to keep COG more closely inside walker frame.  Upon return to room, vc required for safe approach to chair.  Pt left up in recliner chair at bed side with all needs in reach, and nurse aware of pt request for pain medication.   Pt may benefit from IPPT to address deficits and achieve goals above. Recommend HHPT and RW at

## 2024-04-12 NOTE — PROGRESS NOTES
GASTROINTESTINAL ENDOSCOPY N/A 2023    EGD with biopsy-Dr. Barreto      Social History     Tobacco Use    Smoking status: Former     Current packs/day: 0.00     Types: Cigarettes     Quit date:      Years since quittin.2    Smokeless tobacco: Never   Substance Use Topics    Alcohol use: Not Currently      Family History   Problem Relation Age of Onset    Diabetes Mother     No Known Problems Father         father unknown to pt      Prior to Admission medications    Medication Sig Start Date End Date Taking? Authorizing Provider   estradiol (CLIMARA) 0.0375 MG/24HR Place 1 patch onto the skin once a week Indications: hormone patch- replace every Monday    ProviderUrbano MD   Pediatric Multivit-Minerals (FLINTSTONES GUMMIES PO) Take by mouth Daily    Urbano Dias MD   Probiotic Product (PROBIOTIC DAILY PO) Take by mouth Daily    Urbano Dias MD   acetaminophen (TYLENOL) 500 MG tablet Take 2 tablets by mouth every 6 hours as needed for Pain    Urbano Dias MD   DULoxetine (CYMBALTA) 20 MG extended release capsule Take 1 capsule by mouth nightly Indications: anxiety & depression    Urbano Dias MD   valACYclovir (VALTREX) 500 MG tablet Take by mouth as needed    ProviderUrbano MD     Current Facility-Administered Medications   Medication Dose Route Frequency Provider Last Rate Last Admin    acetaminophen (TYLENOL) tablet 650 mg  650 mg Oral Q6H PRN Anaya Harley MD        0.9 % sodium chloride infusion   IntraVENous PRN Srinath Barreto MD        HYDROmorphone HCl PF (DILAUDID) injection 0.5 mg  0.5 mg IntraVENous Q2H PRN Srinath Barreto MD   0.5 mg at 24 0948    sodium chloride flush 0.9 % injection 5-40 mL  5-40 mL IntraVENous 2 times per day Srinath Barreto MD   10 mL at 24 0841    sodium chloride flush 0.9 % injection 5-40 mL  5-40 mL IntraVENous PRN Srinath Barreto MD        0.9 % sodium chloride infusion    N/A     Status post gastric bypass. Oral contrast material extends into the stomach and through the gastrojejunostomy tube without evidence of extraluminal contrast extension, in addition drainage catheter in the anterior abdominal wall demonstrates no contrast material within the LATRICIA drain, postsurgical leak is considered unlikely. Minimal bibasilar atelectasis/trace effusion typical for postsurgical state.  Incidental and/or nonemergent findings are as described in detail above.     XR CHEST 1 VIEW    Result Date: 4/10/2024  EXAM: XR CHEST 1 VIEW INDICATION: post surgical COMPARISON: None. FINDINGS: A single view of the chest demonstrates left basilar atelectasis. The cardiac and mediastinal contours and pulmonary vascularity are normal. The bones and soft tissues are within normal limits.     Left basilar atelectasis.       ·Please note: Voice-recognition software may have been used to generate this report, which may have resulted in some phonetic-based errors in grammar and contents. Even though attempts were made to correct all the mistakes, some may have been missed, and remained in the body of the document.    Manny Alfaro MD  4/12/2024

## 2024-04-12 NOTE — PLAN OF CARE
Problem: Discharge Planning  Goal: Discharge to home or other facility with appropriate resources  Outcome: Progressing  Flowsheets (Taken 4/12/2024 0800)  Discharge to home or other facility with appropriate resources:   Identify barriers to discharge with patient and caregiver   Identify discharge learning needs (meds, wound care, etc)   Arrange for needed discharge resources and transportation as appropriate   Arrange for interpreters to assist at discharge as needed   Refer to discharge planning if patient needs post-hospital services based on physician order or complex needs related to functional status, cognitive ability or social support system     Problem: Pain  Goal: Verbalizes/displays adequate comfort level or baseline comfort level  4/12/2024 1016 by Juana Knapp RN  Outcome: Progressing  Flowsheets (Taken 4/12/2024 0700)  Verbalizes/displays adequate comfort level or baseline comfort level:   Encourage patient to monitor pain and request assistance   Assess pain using appropriate pain scale  4/11/2024 2125 by Mariam Wasserman, RN  Outcome: Progressing     Problem: Safety - Adult  Goal: Free from fall injury  4/12/2024 1016 by Juana Knapp, RN  Outcome: Progressing  4/11/2024 2125 by Mariam Wasserman, RN  Outcome: Progressing

## 2024-04-12 NOTE — PROGRESS NOTES
1312  Transfer report received from OG Arias on patient arriving from ICU.    1332  Patient arrived to unit at this time AAOx4. Right AC PIV and right hand PIV are C/D/I and SL, no s/s of infiltration or phlebitis. VSS on 1L NC. Breath sounds clear bilaterally. Patient reaching 750 on IS. Steri-strips to 5 abdominal lap sites are C/D/I. LATRICIA drain charged and patent, dressing is intact with old breakthrough drainage noted. No other concerns at this time. Safety measures intact, call bell within reach.     1350  This RN called MD Harley to ask if patient needs PT orders since she arrived from ICU. Per MD Harley, this RN to place orders for PT eval and treat. T.O. RBV.     1532  Patient voided 250mL urine    1654  PT in to see patient     1658  Patient states pain 7/10, PRN dilaudid given per mar    1821  Patient states pain 7/10, PRN tylenol given at patient request    1825  30mL dark red fluid emptied from LATRICIA drain.     1915  Change of shift report given to OG Ibarra.  Patient had 3 small bowel movements and voided 3 times during this RN shift.

## 2024-04-13 ENCOUNTER — HOME HEALTH ADMISSION (OUTPATIENT)
Age: 59
End: 2024-04-13
Payer: MEDICAID

## 2024-04-13 VITALS
TEMPERATURE: 99 F | SYSTOLIC BLOOD PRESSURE: 144 MMHG | OXYGEN SATURATION: 99 % | DIASTOLIC BLOOD PRESSURE: 81 MMHG | HEART RATE: 85 BPM | WEIGHT: 181.3 LBS | HEIGHT: 63 IN | BODY MASS INDEX: 32.12 KG/M2 | RESPIRATION RATE: 18 BRPM

## 2024-04-13 PROBLEM — R58 BLEEDING: Status: RESOLVED | Noted: 2024-04-10 | Resolved: 2024-04-13

## 2024-04-13 LAB
ABO + RH BLD: NORMAL
ANION GAP SERPL CALC-SCNC: 6 MMOL/L (ref 3–18)
BASOPHILS # BLD: 0.1 K/UL (ref 0–0.1)
BASOPHILS NFR BLD: 1 % (ref 0–2)
BLD PROD TYP BPU: NORMAL
BLOOD BANK BLOOD PRODUCT EXPIRATION DATE: NORMAL
BLOOD BANK BLOOD PRODUCT EXPIRATION DATE: NORMAL
BLOOD BANK DISPENSE STATUS: NORMAL
BLOOD BANK ISBT PRODUCT BLOOD TYPE: 5100
BLOOD BANK ISBT PRODUCT BLOOD TYPE: 5100
BLOOD BANK PRODUCT CODE: NORMAL
BLOOD BANK PRODUCT CODE: NORMAL
BLOOD BANK UNIT TYPE AND RH: NORMAL
BLOOD BANK UNIT TYPE AND RH: NORMAL
BLOOD GROUP ANTIBODIES SERPL: NORMAL
BPU ID: NORMAL
BUN SERPL-MCNC: 11 MG/DL (ref 7–18)
BUN/CREAT SERPL: 28 (ref 12–20)
CALCIUM SERPL-MCNC: 8 MG/DL (ref 8.5–10.1)
CHLORIDE SERPL-SCNC: 104 MMOL/L (ref 100–111)
CO2 SERPL-SCNC: 28 MMOL/L (ref 21–32)
CREAT SERPL-MCNC: 0.4 MG/DL (ref 0.6–1.3)
CROSSMATCH RESULT: NORMAL
DIFFERENTIAL METHOD BLD: ABNORMAL
EOSINOPHIL # BLD: 0.6 K/UL (ref 0–0.4)
EOSINOPHIL NFR BLD: 5 % (ref 0–5)
ERYTHROCYTE [DISTWIDTH] IN BLOOD BY AUTOMATED COUNT: 12.4 % (ref 11.6–14.5)
GLUCOSE SERPL-MCNC: 86 MG/DL (ref 74–99)
HCT VFR BLD AUTO: 28.1 % (ref 35–45)
HGB BLD-MCNC: 9.5 G/DL (ref 12–16)
IMM GRANULOCYTES # BLD AUTO: 0.2 K/UL (ref 0–0.04)
IMM GRANULOCYTES NFR BLD AUTO: 2 % (ref 0–0.5)
LYMPHOCYTES # BLD: 2.5 K/UL (ref 0.9–3.6)
LYMPHOCYTES NFR BLD: 21 % (ref 21–52)
MCH RBC QN AUTO: 30.4 PG (ref 24–34)
MCHC RBC AUTO-ENTMCNC: 33.8 G/DL (ref 31–37)
MCV RBC AUTO: 89.8 FL (ref 78–100)
MONOCYTES # BLD: 0.9 K/UL (ref 0.05–1.2)
MONOCYTES NFR BLD: 8 % (ref 3–10)
NEUTS SEG # BLD: 7.4 K/UL (ref 1.8–8)
NEUTS SEG NFR BLD: 64 % (ref 40–73)
NRBC # BLD: 0.02 K/UL (ref 0–0.01)
NRBC BLD-RTO: 0.2 PER 100 WBC
PLATELET # BLD AUTO: 247 K/UL (ref 135–420)
PMV BLD AUTO: 9 FL (ref 9.2–11.8)
POTASSIUM SERPL-SCNC: 3.5 MMOL/L (ref 3.5–5.5)
RBC # BLD AUTO: 3.13 M/UL (ref 4.2–5.3)
SODIUM SERPL-SCNC: 138 MMOL/L (ref 136–145)
SPECIMEN EXP DATE BLD: NORMAL
UNIT DIVISION: 0
UNIT ISSUE DATE/TIME: NORMAL
UNIT ISSUE DATE/TIME: NORMAL
WBC # BLD AUTO: 11.6 K/UL (ref 4.6–13.2)

## 2024-04-13 PROCEDURE — 2580000003 HC RX 258: Performed by: SPECIALIST

## 2024-04-13 PROCEDURE — 36415 COLL VENOUS BLD VENIPUNCTURE: CPT

## 2024-04-13 PROCEDURE — 97116 GAIT TRAINING THERAPY: CPT

## 2024-04-13 PROCEDURE — 6360000002 HC RX W HCPCS: Performed by: SPECIALIST

## 2024-04-13 PROCEDURE — 80048 BASIC METABOLIC PNL TOTAL CA: CPT

## 2024-04-13 PROCEDURE — 85025 COMPLETE CBC W/AUTO DIFF WBC: CPT

## 2024-04-13 PROCEDURE — 6370000000 HC RX 637 (ALT 250 FOR IP): Performed by: SURGERY

## 2024-04-13 RX ORDER — ONDANSETRON 4 MG/1
4 TABLET, FILM COATED ORAL EVERY 8 HOURS PRN
Qty: 30 TABLET | Refills: 0 | Status: SHIPPED | OUTPATIENT
Start: 2024-04-13 | End: 2024-04-23

## 2024-04-13 RX ADMIN — ACETAMINOPHEN 650 MG: 325 TABLET ORAL at 01:02

## 2024-04-13 RX ADMIN — METOCLOPRAMIDE 10 MG: 5 INJECTION, SOLUTION INTRAMUSCULAR; INTRAVENOUS at 08:06

## 2024-04-13 RX ADMIN — SODIUM CHLORIDE, PRESERVATIVE FREE 10 ML: 5 INJECTION INTRAVENOUS at 08:09

## 2024-04-13 RX ADMIN — METOCLOPRAMIDE 10 MG: 5 INJECTION, SOLUTION INTRAMUSCULAR; INTRAVENOUS at 02:46

## 2024-04-13 ASSESSMENT — PAIN SCALES - GENERAL
PAINLEVEL_OUTOF10: 0
PAINLEVEL_OUTOF10: 5

## 2024-04-13 ASSESSMENT — PAIN DESCRIPTION - ORIENTATION: ORIENTATION: ANTERIOR

## 2024-04-13 ASSESSMENT — PAIN DESCRIPTION - LOCATION: LOCATION: ABDOMEN

## 2024-04-13 ASSESSMENT — PAIN DESCRIPTION - DESCRIPTORS: DESCRIPTORS: SORE;ACHING

## 2024-04-13 NOTE — DISCHARGE INSTR - COC
Continuity of Care Form    Patient Name: Smita Multani   :  1965  MRN:  [id number]    Admit date:  2024  Discharge date:  ***    Code Status Order: Full Code   Advance Directives:   Advance Care Flowsheet Documentation       Date/Time Healthcare Directive Type of Healthcare Directive Copy in Chart Healthcare Agent Appointed Healthcare Agent's Name Healthcare Agent's Phone Number    04/10/24 0135 No, patient does not have an advance directive for healthcare treatment -- -- -- -- --    24 0953 No, patient does not have an advance directive for healthcare treatment -- -- -- -- --            Admitting Physician:  Srinath Barreto MD  PCP: Corrie Schwartz DO    Discharging Nurse: ***  Discharging Hospital Unit/Room#: 328/01  Discharging Unit Phone Number: ***    Emergency Contact:   Extended Emergency Contact Information  Primary Emergency Contact: Philip Multani  Home Phone: [phone]  Mobile Phone: [phone]  Relation: Spouse    Past Surgical History:  Past Surgical History:   Procedure Laterality Date     SECTION      x 3 (, , )    CYSTOSCOPY W/ URETERAL STENT PLACEMENT      hx kidney stones    EYE SURGERY Left 2023    for glaucoma    HYSTERECTOMY (CERVIX STATUS UNKNOWN)      hx endometriosis    LAPAROSCOPY N/A 4/10/2024    LAPAROSCOPY DIAGNOSTIC, EVACUATION OF HEMATOMA, LIGATION OFMULTIPLE BLEEDING SITES performed by Srinath Barreto MD at [facility] MAIN OR    ARTUR-EN-Y GASTRIC BYPASS N/A 2024    1. LAPAROSCOPIC GASTRIC BYPASS CONVERSION 2.PATRIAL GASTRECTOMY 3.LIVER WEDGE BIOPSY 4.INTRAOPERATIVE ENDOSCOPY 5. Lysis of adheasions 1 hour performed by Srinath Barreto MD at [facility] MAIN OR    SLEEVE GASTRECTOMY  2013    surgery in [place]    TONSILLECTOMY  1986    UPPER GASTROINTESTINAL ENDOSCOPY N/A 2023    EGD with biopsy-Dr. Barreto       Immunization History:   Immunization History   Administered Date(s) Administered    COVID-19, J&J, (age 18y+),  Catheter:925049739}   Colostomy/Ileostomy/Ileal Conduit: {YES / NO:91975}       Date of Last BM: ***    Intake/Output Summary (Last 24 hours) at 2024 0955  Last data filed at 2024 0812  Gross per 24 hour   Intake 120 ml   Output 2575 ml   Net -2455 ml     I/O last 3 completed shifts:  In: 180 [P.O.:170; IV Piggyback:10]  Out: 3790 [Urine:3625; Drains:165]    Safety Concerns:     { ALANA Safety Concerns:918280485}    Impairments/Disabilities:      { ALANA Impairments/Disabilities:493546119}    Nutrition Therapy:  Current Nutrition Therapy:   { ALANA Diet List:715624140}    Routes of Feeding: {[facility] Other Feedings:776297565}  Liquids: {Slp liquid thickness:85798}  Daily Fluid Restriction: {[facility] DME Yes amt example:831153472}  Last Modified Barium Swallow with Video (Video Swallowing Test): {Done Not Done Date:}    Treatments at the Time of Hospital Discharge:   Respiratory Treatments: ***  Oxygen Therapy:  {Therapy; copd oxygen:16942}  Ventilator:    {[facility] Vent List:696609498}    Rehab Therapies: {THERAPEUTIC INTERVENTION:7825749476}  Weight Bearing Status/Restrictions: {[facility] Weight Bearin}  Other Medical Equipment (for information only, NOT a DME order):  {EQUIPMENT:798159023}  Other Treatments: ***    Patient's personal belongings (please select all that are sent with patient):  {[facility] DME Belongings:075837342}    RN SIGNATURE:  {Esignature:426815365}    CASE MANAGEMENT/SOCIAL WORK SECTION    Inpatient Status Date: ***    Readmission Risk Assessment Score:  Readmission Risk              Risk of Unplanned Readmission:  9           Discharging to Facility/ Agency   Name:   Address:  Phone:  Fax:    Dialysis Facility (if applicable)   Name:  Address:  Dialysis Schedule:  Phone:  Fax:    / signature: {Esignature:202706810}    PHYSICIAN SECTION    Prognosis: {Prognosis:8213418276}    Condition at Discharge: { Patient Condition:892109274}    Rehab Potential (if

## 2024-04-13 NOTE — DISCHARGE SUMMARY
Discharge Summary    Date: 4/13/2024  Patient Name: Smita Multani    YOB: 1965     Age: 59 y.o.    Admit Date: 4/9/2024  Discharge Date: 4/13/2024  Discharge Condition: Good    Admission Diagnosis  Gastroesophageal reflux disease with esophagitis, unspecified whether hemorrhage [K21.00];Bariatric surgery status [Z98.84];Chronic GERD [K21.9];Hourglass stricture of stomach [K31.2]      Discharge Diagnosis  Principal Problem:    Chronic GERD  Active Problems:    Hourglass stricture of stomach    Bleeding  Resolved Problems:    * No resolved hospital problems. *      Hospital Stay  Narrative of Hospital Course:  Pt was admitted on 4/9/2024 and underwent revision of Lap sleeve gastrectomy to Lap azalia en Y gastric bypass. Initially she did well, but within 4hours post op was noted to have hypotension and concern for post op bleeding. She was taken back to the OR for diagnostic laparoscopy, washout, evacuation of clot, and control of bleeding vessels. She was brought to the ICU post op for pulmonary monitoring due to low O2 sats. Pulmonary was consulted and recommended aggressive ICS as well as diuresis. She continued to do well and slowly improved. On POD#3 she was sipping her clear liquids and there was noted to be a change in her LATRICIA output. She underwent CT swallow to eval for poss leak. No leak was seen at GJ or JJ with contrast easily moving distally. She was cleared to resume her diet and then transfer to the floor. Once upon the floor she improved significantly from a pulmonary standpoint and was weaned off oxygen. PT was consulted and recommended home PT as well as a walker. Her diet was advanced to bariatric fulls. She was doing well and was given drain care instructions. She was discharged home on 4/13/24 with her LATRICIA drain and planned follow up in the office for removal.     Consultants:  IP CONSULT TO PULMONOLOGY  IP CONSULT HOME HEALTH    Surgeries/procedures Performed:  Lap revision of sleeve  gastrectomy to azalia en Y gastric bypass  Diagnostic laparoscopy, washout and evacuation of clot, ligation of bleeding vessel     Treatments:    Surgery and Therapies    PT    Discharge Plan/Disposition:  Home    Hospital/Incidental Findings Requiring Follow Up:    Patient Instructions:    Diet:    Activity:Ambulate in House, No Lifting, Driving or Strenuous Excercise and No Driving for 2 Weeks  For number of days (if applicable):      Other Instructions: Bariatric full liquid diet of protein shakes, water, sugar free popsicles and broth. Goal intake of 64oz fluid per day, 60g protein daily.   Start Multivitamins  Record LATRICIA drain outputs daily-empty bulb, reconstitute, record outputs  Home PT will be arranged,  Ambulate with RW      Provider Follow-Up:   No follow-ups on file.     Significant Diagnostic Studies:    Recent Labs:  Admission on 04/09/2024  No results displayed because visit has over 200 results.    ------------    Radiology last 7 days:  CT ABDOMEN PELVIS WO CONTRAST Additional Contrast? Oral    Result Date: 4/12/2024  Status post gastric bypass. Oral contrast material extends into the stomach and through the gastrojejunostomy tube without evidence of extraluminal contrast extension, in addition drainage catheter in the anterior abdominal wall demonstrates no contrast material within the LATRICIA drain, postsurgical leak is considered unlikely. Minimal bibasilar atelectasis/trace effusion typical for postsurgical state.  Incidental and/or nonemergent findings are as described in detail above.     XR CHEST 1 VIEW    Result Date: 4/10/2024  Left basilar atelectasis.       [unfilled]    Discharge Medications    Current Discharge Medication List        Current Discharge Medication List        Current Discharge Medication List    CONTINUE these medications which have NOT CHANGED    estradiol (CLIMARA) 0.0375 MG/24HR  Place 1 patch onto the skin once a week Indications: hormone patch- replace every

## 2024-04-13 NOTE — DISCHARGE INSTR - DIET
Good nutrition is important when healing from an illness, injury, or surgery.  Follow any nutrition recommendations given to you during your hospital stay.   If you were given an oral nutrition supplement while in the hospital, continue to take this supplement at home.  You can take it with meals, in-between meals, and/or before bedtime. These supplements can be purchased at most local grocery stores, pharmacies, and chain [employer]-stores.   If you have any questions about your diet or nutrition, call the hospital and ask for the dietitian.  Goal protein intake of at least 60g per day of protein shakes. Goal fluid intake of 64oz per day. May also drink broth, sugar free popsicles, caffeine free herbal tea. No sweetened beverages. No carbonated beverages. No straws.

## 2024-04-13 NOTE — DISCHARGE INSTR - ACTIVITY
Walking frequently with RW. May climb stairs. No heavy lifting or strenuous activity. May shower, but no submerging wounds in tub, pool, ocean for 2weeks. Redress site around LATRICIA drain after cleaning.

## 2024-04-13 NOTE — PROGRESS NOTES
2310    Bedside and Verbal shift change report given to OG Dias (oncoming nurse) by OG Ibarra (offgoing nurse). Report included the following information Nurse Handoff Report, Adult Overview, Surgery Report, Intake/Output, MAR, and Recent Results.     Assumed care of patient as of this time. Patient NAD, denies N/V at present, c/o soreness of the abdomen, NRS 4-5/10, steri strips to 5 lap sites C/D/I, dressing to LATRICIA drain dry and intact, noted presence of old dressing. No other complaints at present.     Safety measures in place, over bed table and call bell within reach.     0105    Ambulated with walker x SB assist to hallway, nurses' station and back to room.     0400    Ambulated to hallway and back to room.     0700    Patient had 3 small, loose BM throughout the shift. Total LATRICIA output drained during the shift was 35 mL of dark red output.     0745    Bedside and Verbal shift change report given to OG Lam (oncoming nurse) by OG Dias (offgoing nurse). Report included the following information Nurse Handoff Report, Adult Overview, Surgery Report, Intake/Output, MAR, and Recent Results.

## 2024-04-13 NOTE — PROGRESS NOTES
Progress Note  Date:2024       Room:[facility]  Patient Name:Smita Multani     YOB: 1965     Age:59 y.o.        Subjective    Subjective Pt feels great and wants to go home. No nausea or vomiting, tolerating fluids. She had brown loose stool overnight. Pain is controlled, but doesn't like tylenol tabs. She would prefer to use home meds. She is breathing better and able to pull 2000 on ICS. She has been walking with walker and was told per PT To use RW and have home PT. She said once she arrived in the floor she started feeling much better. Breathing became easier.   Review of Systems  Objective         Vitals Last 24 Hours:  TEMPERATURE:  Temp  Av.4 °F (36.9 °C)  Min: 98 °F (36.7 °C)  Max: 99 °F (37.2 °C)  RESPIRATIONS RANGE: Resp  Av.9  Min: 15  Max: 18  PULSE OXIMETRY RANGE: SpO2  Av.5 %  Min: 96 %  Max: 99 %  PULSE RANGE: Pulse  Av.2  Min: 85  Max: 102  BLOOD PRESSURE RANGE: Systolic (24hrs), Av , Min:136 , Max:153   ; Diastolic (24hrs), Av, Min:81, Max:90    I/O (24Hr):    Intake/Output Summary (Last 24 hours) at 2024 0912  Last data filed at 2024 0812  Gross per 24 hour   Intake 120 ml   Output 2575 ml   Net -2455 ml     Objective BP remains mildly elevated, HR now 80s.   Awake, alert, NAD,pt seen walking in room upon arrival  Lungs clear olvin, decreased at bases  Cor reg  Abd soft, tender incisions, incisions clean with steri strips, intact. LATRICIA drain is dark thin sanguinous/maroon color  Labs/Imaging/Diagnostics    Labs:  CBC:  Recent Labs     24  0420 24  0538 24  0520   WBC 17.0* 12.5 11.6   RBC 3.56* 3.40* 3.13*   HGB 10.7* 10.3* 9.5*   HCT 31.8* 30.7* 28.1*   MCV 89.3 90.3 89.8   RDW 13.1 12.9 12.4    246 247     CHEMISTRIES:  Recent Labs     24  0420 24  0538 24  0520    136 138   K 4.3 3.8 3.5    104 104   CO2 29 27 28   BUN 9 9 11   CREATININE 0.55* 0.39* 0.40*   GLUCOSE 130* 97 86  removal in office outpatient.   Since we are holding lovenox for post op bleed, pt will need to continue ambulation for VTE prophylaxis. Hgb down to 9.5. I do not suspect ongoing bleeding given normal HR and good UOP. I think this is likely fluid mobilization/dilution  Cont daily PPI-pt has home omeprazole. Pt to start home MVI  Discharge today-pt has home meds. OG Lynn will call pt Monday to arrange follow up.     Electronically signed by Anaya Harley MD on 4/13/24 at 9:12 AM EDT

## 2024-04-13 NOTE — PROGRESS NOTES
DC plan: home with HH for PT and RW    Received page from Dr. Harley requesting HH for PT; [facility] offered to patient and she has chosen [facility]; referral placed.  Patient will also need RW per PT and this will be ordered and delivered to patient by care manager.

## 2024-04-13 NOTE — PROGRESS NOTES
Patient received discharge instructions all questions were answered. Patient received drain care education, patient demonstrated understanding and returned popper techniques. No signs of distress at this time . Patient will leave with  on her private vehicle.

## 2024-04-13 NOTE — DISCHARGE INSTRUCTIONS
OG Figueroa will call patient on 4/15/24 to follow up on drain outputs, patient progress and arrange follow up for drain removal.   
[employer] dose 1 and 2

## 2024-04-13 NOTE — DISCHARGE INSTR - MEDS
Oxycodone/Acetaminophen Oral Tablet 5 mg/325 mg  For pain.  Brand Name(s): Endocet, Percocet  Generic Name: Oxycodone/Acetaminophen  Instructions  This medicine may be taken with or without food.  Store at room temperature away from heat, light, and moisture. Do not keep in the bathroom.  Please ask your doctor, nurse, or pharmacist how to discard unused medicines safely.  To reduce constipation, eat high fiber foods, drink plenty of water and exercise.  Drug interactions can change how medicines work or increase risk for side effects. Tell your health care providers about all medicines taken. Include prescription and over-the-counter medicines, vitamins, and herbal medicines. Speak with your doctor or pharmacist before starting or stopping any medicine.  Tell your doctor if symptoms do not get better or if they get worse.  Do not take more than 12 pills in a day.  Cautions  This medicine has an opioid. Opioids help many people but may cause addiction, especially if used for a long time. The addiction risk is higher if you have a substance use disorder (overuse of or addiction to drugs or alcohol). Ask your doctor about the benefits and risks.  Ask your doctor or pharmacist if you should have naloxone on hand to treat opioid overdose. Teach your family or household members about the signs of an opioid overdose and how to treat it.  If you stop this medicine suddenly after using it for a long time, you may have withdrawal. Your doctor may slowly lower your dose before stopping it. Tell your doctor right away if you have symptoms, such as unusual sweating, watering eyes, runny nose, chills, diarrhea, yawning, muscle aches, restlessness, anxiety, trouble sleeping, or thoughts of suicide.  Tell your doctor and pharmacist if you ever had an allergic reaction to a medicine.  Do not use the medication any more than instructed.  If possible, avoid using with alcohol, marijuana, or other medicines that can cause

## 2024-04-13 NOTE — PROGRESS NOTES
PHYSICAL THERAPY TREATMENT/Discharge    Patient: Smita Multani (59 y.o. female)  Date: 4/13/2024  Diagnosis: Gastroesophageal reflux disease with esophagitis, unspecified whether hemorrhage [K21.00]  Bariatric surgery status [Z98.84]  Chronic GERD [K21.9]  Hourglass stricture of stomach [K31.2] Chronic GERD  Procedure(s) (LRB):  LAPAROSCOPY DIAGNOSTIC, EVACUATION OF HEMATOMA, LIGATION OFMULTIPLE BLEEDING SITES (N/A) 3 Days Post-Op  Precautions: Falls ,  ,  ,  ,  ,  ,  ,      ASSESSMENT:  Pt. Received upright in bed with  in room and agreeable to PT. Pt. Demonstrates IND with all functional mobility during session with RW including supine <>sit, sit <>stand, and ambulation of 300' in hallway with minimal deviations to note. Pt. Vitals WNL throughout as well and reporting near baseline function. PT to sign off acute care PT and recommend Outpatient PT follow-up at discharge for continued gains in functional endurance, dynamic standing balance, and strength. Pt. Left with all needs met with  in room.    Progression toward goals: All PT goals met  [x]      Improving appropriately and progressing toward goals  []      Improving slowly and progressing toward goals  []      Not making progress toward goals and plan of care will be adjusted     PLAN:  Patient continues to benefit from skilled intervention to address the above impairments.  Continue treatment per established plan of care.    Further Equipment Recommendations for Discharge: walker     SUBJECTIVE:   Patient stated, \"Im ready to get out of here!.\"    OBJECTIVE DATA SUMMARY:   Critical Behavior:  Orientation  Overall Orientation Status: Within Normal Limits  Functional Mobility Training:  Bed Mobility:  Bed Mobility Training  Bed Mobility Training: No  Transfers:  Transfer Training  Transfer Training: Yes  Overall Level of Assistance: Independent  Interventions: Safety awareness training;Verbal cues  Sit to Stand: Independent  Stand to Sit:

## 2024-04-14 ENCOUNTER — HOSPITAL ENCOUNTER (EMERGENCY)
Facility: HOSPITAL | Age: 59
Discharge: HOME | End: 2024-04-14
Payer: MEDICAID

## 2024-04-14 VITALS
WEIGHT: 178 LBS | RESPIRATION RATE: 16 BRPM | OXYGEN SATURATION: 97 % | HEIGHT: 63 IN | HEART RATE: 62 BPM | TEMPERATURE: 98 F | BODY MASS INDEX: 31.54 KG/M2 | SYSTOLIC BLOOD PRESSURE: 138 MMHG | DIASTOLIC BLOOD PRESSURE: 70 MMHG

## 2024-04-14 DIAGNOSIS — Z98.84 HISTORY OF BARIATRIC SURGERY: ICD-10-CM

## 2024-04-14 DIAGNOSIS — S30.1XXA ABDOMINAL HEMATOMA: ICD-10-CM

## 2024-04-14 DIAGNOSIS — Z48.89 ENCOUNTER FOR POST SURGICAL WOUND CHECK: Primary | ICD-10-CM

## 2024-04-14 PROCEDURE — 99283 EMERGENCY DEPT VISIT LOW MDM: CPT

## 2024-04-14 PROCEDURE — 49451 REPLACE DUOD/JEJ TUBE PERC: CPT

## 2024-04-14 RX ORDER — HYDROCODONE BITARTRATE AND ACETAMINOPHEN 5; 325 MG/1; MG/1
1 TABLET ORAL EVERY 6 HOURS PRN
Qty: 8 TABLET | Refills: 0 | Status: SHIPPED | OUTPATIENT
Start: 2024-04-14 | End: 2024-04-16

## 2024-04-14 ASSESSMENT — PAIN - FUNCTIONAL ASSESSMENT: PAIN_FUNCTIONAL_ASSESSMENT: 0-10

## 2024-04-14 ASSESSMENT — PAIN SCALES - GENERAL: PAINLEVEL_OUTOF10: 0

## 2024-04-14 NOTE — ED PROVIDER NOTES
[facility] EMERGENCY DEPT  EMERGENCY DEPARTMENT ENCOUNTER       Pt Name: Smita Multani  MRN: [id number]  Birthdate 1965  Date of evaluation: 2024  PCP: Corrie Schwartz DO  Note Started: 6:02 PM 24     CHIEF COMPLAINT       Chief Complaint   Patient presents with    Post-op Problem        HISTORY OF PRESENT ILLNESS: 1 or more elements      History From: Patient  HPI Limitations: None  Chronic Conditions: none  Social Determinants affecting Dx or Tx: none      Smita Multani is a 59 y.o. female who presents to ED c/o LATRICIA drain complication. Patient underwent gastric sleeve to bypass by Dr. Barreto 5 days ago, complicated by postoperative bleeding which required laparoscopically for evacuation of hematoma and ligation of multiple bleeding sites patient accidentally pulled the drain out today but states that it was supposed to remain in place for another 3 days.     Nursing Notes were all reviewed and agreed with or any disagreements were addressed in the HPI.    PAST HISTORY     Past Medical History:  Past Medical History:   Diagnosis Date    Anxiety and depression     duloxetine    Blood type O+     Diverticulosis 2023    found on 2023 CT scan    Glaucoma 2023    had glaucoma surgery to left eye    History of kidney stones 2014    hx stent    History of sleeve gastrectomy 2013    surgery in [place]    History of Javon-Parkinson-White (WPW) syndrome     treated and corrected with ablation. no cardiologist currently    Hx of endometriosis     had hysterectomy    Intestinal malabsorption     Obesity     Prolonged emergence from general anesthesia     slow to wake up    Smoking history     quit  after passive history    Wears reading eyeglasses        Past Surgical History:  Past Surgical History:   Procedure Laterality Date     SECTION      x 3 (, , )    CYSTOSCOPY W/ URETERAL STENT PLACEMENT      hx kidney stones    EYE SURGERY Left 2023    for glaucoma

## 2024-04-15 ENCOUNTER — TELEPHONE (OUTPATIENT)
Age: 59
End: 2024-04-15

## 2024-04-15 ENCOUNTER — HOME CARE VISIT (OUTPATIENT)
Age: 59
End: 2024-04-15
Payer: MEDICAID

## 2024-04-15 VITALS
OXYGEN SATURATION: 99 % | DIASTOLIC BLOOD PRESSURE: 69 MMHG | RESPIRATION RATE: 18 BRPM | TEMPERATURE: 97.9 F | SYSTOLIC BLOOD PRESSURE: 103 MMHG | HEART RATE: 80 BPM

## 2024-04-15 PROCEDURE — G0151 HHCP-SERV OF PT,EA 15 MIN: HCPCS

## 2024-04-15 ASSESSMENT — ENCOUNTER SYMPTOMS
DYSPNEA ACTIVITY LEVEL: AFTER AMBULATING MORE THAN 20 FT
PAIN LOCATION - PAIN QUALITY: SHARP

## 2024-04-15 NOTE — TELEPHONE ENCOUNTER
This RN spoke with patient post-operatively.     Hydration: Patient hydrated with 32 ounces thus far today.    Nausea and/or vomitting: None    Pain: Currently managed with Tylenol    Lovenox injections: Contraindicated as per patient's hospital course    Lap sites: No erythema, drainage, and/or swelling    LATRICIA drain site: No drainage; dressing being changed daily    BM: Daily    Ambulation: Patient is walking throughout house every hour.    IS: Patient continues to use 10x's per hour while awake.  She has reached 2,000 mL.    Temperature: 98.7 degrees    Medications: (confirmed currently taking)   *Flintstones: yes   *Probiotic: yes   *Prilosec: yes    Questions: None    This RN reminded the patient to contact the office with any questions and/or concerns.  RN also reminded patient they will receive another follow-up TC prior to the two week post-op follow-up appointment.  Patient verbalized understanding to both.  Patient's two week post-op visit is scheduled and was confirmed.

## 2024-04-16 ASSESSMENT — ENCOUNTER SYMPTOMS
CRAMPS: 1
FLATUS: 1

## 2024-04-16 NOTE — CASE COMMUNICATION
Smita Multani is a 59 y.o. female referred s/p revision of lap sleeve 4/9/24. PMH: gastric bypass, GERD, WPW with ablation, anxiety, intestinal malabsorption and patient reports fibromyalgia. She has a post-op visit scheduled for 4/17/24. Plan:1W1, patient is currently independent with all functional mobility and does not feel she needs physical therapy in the home at this time.

## 2024-04-16 NOTE — HOME HEALTH
S: Patient stated she was doing very well and all the pain she was having in her lower left abdomen for the last 12 years is gone.   O: PAIN: see pain tab   WOUND: 6 incisions on abdoment, no s/s of infection noted. See uploaded photo   ROM: no impairments noted   STRENGTH: WFL BLE, FTSTS 21 seconds without hands to assist   BED MOBILITY: indpendent   EQUIPMENT: FWW, patient not using  TRANSFERS: MI for all transfers  GAIT: patient is independently ambulating without an AD and good reciprocal gait pattern  STEPS: indpendent with railing and step over step gait   BALANCE: Tinetti 28/28 and TUG 13 seconds - patient is a low fall risk   A:ASSESSMENT AND PROGRESS TOWARD GOALS:  Patient is doing very well and independent with ADL's and ambulating without a walker.     P: 1W1, patient does not require additional home PT at this time      PMH/Summary of clinical condition: Smita Multani is a 59 y.o. female referred s/p revision of lap sleeve 4/9/24. PMH: gastric bypass, GERD, WPW with ablation, anxiety, intestinal malabsorption and patient reports fibromyalgia. She has a post-op visit scheduled for 4/17/24. Plan:1W1, patient is currently independent with all functional mobility and does not feel she needs physical therapy in the home at this time.   Medications reconciled. No changes in medications at this time. Medications are effective at this time. Instructed patient/caregiver to take exact amount of narcotics prescribed, signs and symptoms of oversedation, notify SN/PT if oversedated.  May cause constipation, notify SN/PT if no BM x 3 days.   Social history/ caregivers: lives with her  in a one level home with 2 steps to enter. She works as a CNA. She requires assistance from her  for transportation.   Home exercise program: patient instructed to walk every hour and increase walking time and distance every day. She was instructed in ankle pumps x 10 reps every hour and deep breathing exercises.   Patient's

## 2024-04-18 ENCOUNTER — TELEPHONE (OUTPATIENT)
Age: 59
End: 2024-04-18

## 2024-04-18 NOTE — TELEPHONE ENCOUNTER
This RN spoke with patient post-operatively.     Hydration: Patient hydrated with 64 ounces yesterday.    Nausea and/or vomitting: None    Pain: Currently managed with Percocet and/or Tylenol    Lovenox injections: Contraindicated as per patient's hospital course     Lap sites: No erythema, drainage, and/or swelling    LATRICIA drain site: Drain had fallen out on 04/14/24 (Patient went to ED).  She is changing a 4x4 guaze dressing twice daily.  Drainage is serosanguinous.    BM: None to date, but is passing flatus. Patient will begin taking Miralax.    Ambulation: Patient is walking throughout house every hour.    IS: Patient continues to use 10x's per hour while awake.  She has reached 2,000 mL.    Temperature: 97.6 degrees    Medications: (confirmed currently taking)   *Flintstones: yes   *Probiotic: yes   *Prilosec: yes    Questions: None    This RN reminded the patient to contact the office with any questions and/or concerns.  Patient verbalized understanding.  Patient's two week post-op visit is scheduled and was confirmed.

## 2024-04-22 NOTE — PROGRESS NOTES
Subjective:      Smita Multani is a 59 y.o. female is now 2 weeks status post  laparoscopic conversion sleeve gastrectomy to gastric bypass with MIKE of 75 minutes . Doing well overall.  She has lost a total of 15 pounds since surgery.  Body mass index is 29.62 kg/m². Currently on a liquid diet without difficulty. Taking in  64 oz fluid daily.  Sources of protein include protein shakes and Isopure powder.  No structured exercise, but increasing activity.   Bowel movements are regular. The patient is not having any pain.. The patient is  compliant with multivitamins.   Liver bx report reviewed with patient.  Stomach bx report reviewed with patient.    Had postop hypotension and concern for post op bleeding same day of surgery. She was taken back to the OR for diagnostic laparoscopy, washout, evacuation of clot, and control of bleeding vessels. Discharged on POD # 4 with LATRICIA drain. Had ED visit 4/15  POD #6 when LATRICIA accidentally pulled out. Has continued with dressing changes since with decreasing serosanguinous drainage.    Is moving out of [facility] to [place] by end of May        4/23/2024     9:46 AM 4/15/2024    10:05 AM 4/14/2024    11:01 AM 4/13/2024     7:00 AM 4/13/2024     4:00 AM 4/13/2024    12:20 AM 4/12/2024    11:49 PM   Ambulatory Bariatric Summary   Systolic 116 103 138 144 147  152   Diastolic 75 69 70 81 90  83   Pulse 85 80 62 85 86  97   Temp 98.6 °F (37 °C) 97.9 °F (36.6 °C) 98 °F (36.7 °C) 99 °F (37.2 °C) 98 °F (36.7 °C)  98.6 °F (37 °C)   Respirations  18 16 18 17 16 18   Weight - Scale 167.2  178       Height 1.6 m (5' 3\")  1.6 m (5' 3\")       BMI 29.7 kg/m2  31.6 kg/m2       Weight - Scale 75.8 kg (167 lb 3.2 oz)  80.7 kg (178 lb)       BMI (Calculated) 29.7  31.6              Comorbidities:      Hypertension: N/A  Diabetes: N/A  Obstructive Sleep Apnea: N/A   Hyperlipidemia: N/A  Stress Urinary Incontinence: N/A  Gastroesophageal Reflux:improved, on PPI  Weight related arthropathy:N/A    Denies

## 2024-04-23 ENCOUNTER — HOSPITAL ENCOUNTER (OUTPATIENT)
Facility: HOSPITAL | Age: 59
Discharge: HOME | End: 2024-04-26

## 2024-04-23 ENCOUNTER — OFFICE VISIT (OUTPATIENT)
Age: 59
End: 2024-04-23

## 2024-04-23 VITALS
HEART RATE: 85 BPM | WEIGHT: 167.2 LBS | SYSTOLIC BLOOD PRESSURE: 116 MMHG | DIASTOLIC BLOOD PRESSURE: 75 MMHG | TEMPERATURE: 98.6 F | OXYGEN SATURATION: 100 % | BODY MASS INDEX: 29.62 KG/M2 | HEIGHT: 63 IN

## 2024-04-23 DIAGNOSIS — Z98.84 S/P GASTRIC BYPASS: ICD-10-CM

## 2024-04-23 DIAGNOSIS — R79.89 ELEVATED LFTS: ICD-10-CM

## 2024-04-23 DIAGNOSIS — Z09 POSTOPERATIVE EXAMINATION: Primary | ICD-10-CM

## 2024-04-23 DIAGNOSIS — K75.81 STEATOHEPATITIS: ICD-10-CM

## 2024-04-23 DIAGNOSIS — Z09 POSTOPERATIVE EXAMINATION: ICD-10-CM

## 2024-04-23 LAB — LABCORP SPECIMEN COLLECTION: NORMAL

## 2024-04-23 PROCEDURE — 99024 POSTOP FOLLOW-UP VISIT: CPT | Performed by: NURSE PRACTITIONER

## 2024-04-23 PROCEDURE — 99001 SPECIMEN HANDLING PT-LAB: CPT

## 2024-04-23 RX ORDER — URSODIOL 500 MG/1
500 TABLET, FILM COATED ORAL DAILY
Qty: 30 TABLET | Refills: 4 | Status: SHIPPED | OUTPATIENT
Start: 2024-04-23

## 2024-04-23 RX ORDER — OMEPRAZOLE 20 MG/1
20 CAPSULE, DELAYED RELEASE ORAL DAILY
COMMUNITY

## 2024-04-23 NOTE — PROGRESS NOTES
Post-Op Nutrition Note  [facility]      Patient's Name: Smita Multani Age: 59 y.o.   YOB: 1965 Sex: female     Pt attended nutrition education class on advancing their WLS post-op diet to the Second Stage, Soft/Puree.  Reviewed diet progression for weeks 3-4.    Reviewed pp. 44-50 of the patient post-op education book.     Discussed: post-op diet progression, including soft/pureed high protein, low fat, low sugar food recommendations; proper food group choices;  consumption of food and liquids, and consumption of adequate no-sugar, no-caffeine, no carbonation liquids.    We reviewed appropriate food choices, meal adaptations (use of smaller dishes/utensils, mechanical pureeing of food, eat slowly and chewing sufficiently for digestion), cooking techniques, and eating behavior modifications.  Discussed intake regimen with 3 meals and 2-3 protein supplements per day.  Additionally, intake timing - to include consuming a meal or snack every 3-4 hrs, the 30:30 rule, and having a meal within 2 hours of waking . Reinforced the importance of continued vitamin & probiotic consumption, adequate fluid with goal of 64 oz per day and adequate protein with goal of  grams per day. Stressed the importance of 30 min of physical activity each day, as tolerated, with restricted lifting, to improve post op weight loss results and bowel function.      Pt voiced understanding through class discussion.  All nutrition-related questions answered. Reminded pt that a RD would be calling them again at their 1 mo. post-op mauri.  Pt provided with RD contact information for nutrition-related questions or concerns.    RD: Renetta Blakely MS/SONIA RIZO

## 2024-04-23 NOTE — PATIENT INSTRUCTIONS
Continue focus on hydration.  May advance to soft diet. Please review material in your binder. Remember - your motto is \"soft foods with protein\".    Eat regularly. Continue to use protein shakes.  Remember to eat slowly & not to drink fluids with your meals.  Increase activity as able - cardio (walking) only.  Continue to take multi-vitamins.  Continue regular follow-up with your PCP.  Return to office as scheduled for your next appointment.  Call the office if you have any questions or concerns.    [email or web address] - to find a new bariatric surgeon

## 2024-04-24 LAB
HGB BLD-MCNC: 11.8 G/DL (ref 11.1–15.9)
SPECIMEN STATUS REPORT: NORMAL

## 2024-05-02 ENCOUNTER — TELEPHONE (OUTPATIENT)
Age: 59
End: 2024-05-02

## 2024-05-02 ENCOUNTER — HOME CARE VISIT (OUTPATIENT)
Age: 59
End: 2024-05-02
Payer: MEDICAID

## 2024-05-13 ENCOUNTER — HOSPITAL ENCOUNTER (OUTPATIENT)
Facility: HOSPITAL | Age: 59
Discharge: HOME | End: 2024-05-16

## 2024-05-13 VITALS — HEIGHT: 63 IN | WEIGHT: 159 LBS | BODY MASS INDEX: 28.17 KG/M2

## 2024-05-13 NOTE — PROGRESS NOTES
Patient is a 59 y.o. female approximately 1 mo S/P  laparoscopic conversion sleeve gastrectomy to gastric bypass .      Ht 1.6 m (5' 3\")   Wt 72.1 kg (159 lb)   BMI 28.17 kg/m²     Pt current weight stated, visit was virtual.     Pre-op Wt: 182 lbs  EBW: 41 lbs    Pt has lost 23 lbs since surgery on 4/9/24.  Pt has lost 56% EBW.    Fluid intake: 80+ oz/d    Fluids being consumed include: water, protein shakes, NSA fudge pops (3-4/d)    Protein shake intake:   [x] Premier (30 g/svg PRO) [] Muscle Milk Zero RTD (20 g/svg PRO)    [] Solairedirect Nutrition Plan (30g/svg PRO) [] Muscle Milk Genuine Zero Sugar RTD (25 g/svg PRO)    [] Solairedirect Core Power (26g/svg PRO) [] Premier Clear (20 g/svg PRO)   [] Pure Protein (30 g/svg PRO [] [id number] ( g/svg PRO)   [] Ensure Max (30 g/svg PRO) [] Isopure Infusions (20g/svg, mixed with  )   [] Equate High Performance/Max (30g/svg PRO) [] Isopure Zero Carb, Unflavored,  scoop(s)/d   (25g PRO/scoop, mixed with  )    [x] Other:  SixStar Fruit Loops (30 g/svg)     2 protein shakes/day; 60 g/d PRO from shakes       Pt is currently on a soft food diet with mild difficulty.  Tolerating well.  Has had a few episodes vomiting of eating too fast.      Consuming the following food sources of protein:  scrambled eggs, chicken thigh, rotisserie chicken (dark meat), Oikos Pro, shrimp (sauteed), grilled catfish tuna salad, refried beans, chicken nuggets ([employer], while traveling).  Also reports tried zucchini, Quest chips (4 ea. with tuna fish/refried beans).      Reports eating 3 meals/d, portion sizes are approximately 1.5-2 oz, and meals take approximately 20+ min to complete.      Patient [] has  [x] has not had any readmissions, reoperations, complications, ED visits, nor IVF at [facility] during her first post-op month. 4/24/24 ED for LATRICIA drain complication     Exercise: No structured activity, increasing ADLs; packing to move to [place]    Pt [x]is []is not taking her

## 2024-05-15 ENCOUNTER — OFFICE VISIT (OUTPATIENT)
Age: 59
End: 2024-05-15
Payer: MEDICAID

## 2024-05-15 ENCOUNTER — HOSPITAL ENCOUNTER (OUTPATIENT)
Facility: HOSPITAL | Age: 59
Setting detail: OUTPATIENT SURGERY
Discharge: HOME | End: 2024-05-18
Payer: MEDICAID

## 2024-05-15 ENCOUNTER — HOSPITAL ENCOUNTER (OUTPATIENT)
Facility: HOSPITAL | Age: 59
Discharge: HOME | End: 2024-05-18
Payer: MEDICAID

## 2024-05-15 VITALS
DIASTOLIC BLOOD PRESSURE: 75 MMHG | HEIGHT: 63 IN | BODY MASS INDEX: 28.4 KG/M2 | WEIGHT: 160.3 LBS | HEART RATE: 74 BPM | TEMPERATURE: 98.4 F | OXYGEN SATURATION: 100 % | SYSTOLIC BLOOD PRESSURE: 115 MMHG

## 2024-05-15 VITALS
HEART RATE: 76 BPM | SYSTOLIC BLOOD PRESSURE: 128 MMHG | BODY MASS INDEX: 28.35 KG/M2 | RESPIRATION RATE: 18 BRPM | HEIGHT: 63 IN | WEIGHT: 160 LBS | TEMPERATURE: 97.8 F | DIASTOLIC BLOOD PRESSURE: 78 MMHG

## 2024-05-15 DIAGNOSIS — E66.01 MORBID OBESITY (HCC): ICD-10-CM

## 2024-05-15 DIAGNOSIS — A04.8 H. PYLORI INFECTION: Primary | ICD-10-CM

## 2024-05-15 DIAGNOSIS — K75.81 STEATOHEPATITIS: ICD-10-CM

## 2024-05-15 DIAGNOSIS — Z98.84 S/P GASTRIC BYPASS: ICD-10-CM

## 2024-05-15 DIAGNOSIS — K90.9 INTESTINAL MALABSORPTION, UNSPECIFIED TYPE: ICD-10-CM

## 2024-05-15 PROCEDURE — 74220 X-RAY XM ESOPHAGUS 1CNTRST: CPT

## 2024-05-15 PROCEDURE — 74240 X-RAY XM UPR GI TRC 1CNTRST: CPT | Performed by: SPECIALIST

## 2024-05-15 PROCEDURE — 74240 X-RAY XM UPR GI TRC 1CNTRST: CPT

## 2024-05-15 PROCEDURE — 99213 OFFICE O/P EST LOW 20 MIN: CPT | Performed by: NURSE PRACTITIONER

## 2024-05-15 PROCEDURE — 2500000003 HC RX 250 WO HCPCS: Performed by: SPECIALIST

## 2024-05-15 RX ORDER — METRONIDAZOLE 500 MG/1
500 TABLET ORAL 2 TIMES DAILY
Qty: 20 TABLET | Refills: 0 | Status: SHIPPED | OUTPATIENT
Start: 2024-05-15 | End: 2024-05-25

## 2024-05-15 RX ORDER — MULTIVIT-MIN/IRON/FOLIC ACID/K 45-800-120
CAPSULE ORAL
COMMUNITY

## 2024-05-15 RX ORDER — VITAMIN B COMPLEX
1 CAPSULE ORAL DAILY
COMMUNITY

## 2024-05-15 RX ORDER — CLARITHROMYCIN 500 MG/1
500 TABLET, COATED ORAL 2 TIMES DAILY
Qty: 20 TABLET | Refills: 0 | Status: SHIPPED | OUTPATIENT
Start: 2024-05-15 | End: 2024-05-25

## 2024-05-15 RX ADMIN — BARIUM SULFATE 100 ML: 960 POWDER, FOR SUSPENSION ORAL at 15:31

## 2024-05-15 NOTE — PROGRESS NOTES
GASTRECTOMY  2013    surgery in [place]    TONSILLECTOMY  1986    UPPER GASTROINTESTINAL ENDOSCOPY N/A 11/03/2023    EGD with biopsy-Dr. Barreto     Current Outpatient Medications   Medication Sig Dispense Refill    b complex vitamins capsule Take 1 capsule by mouth daily      Multiple Vitamins-Minerals (BARIATRIC MULTIVITAMINS/IRON) CAPS Take by mouth      ursodiol (ACTIGALL) 500 MG tablet Take 1 tablet by mouth daily 30 tablet 4    omeprazole (PRILOSEC) 20 MG delayed release capsule Take 1 capsule by mouth daily      estradiol (CLIMARA) 0.0375 MG/24HR Place 1 patch onto the skin once a week Indications: hormone patch- replace every Monday      Probiotic Product (PROBIOTIC DAILY PO) Take by mouth Daily      acetaminophen (TYLENOL) 500 MG tablet Take 2 tablets by mouth every 6 hours as needed for Pain      DULoxetine (CYMBALTA) 20 MG extended release capsule Take 1 capsule by mouth nightly Indications: anxiety & depression      valACYclovir (VALTREX) 500 MG tablet Take by mouth as needed       No current facility-administered medications for this visit.       Review of Symptoms:       General - No history or complaints of unexpected fever or chills  Head/Neck - No history or complaints of headache or dizziness  Cardiac - No history or complaints of chest pain, palpitations, or shortness of breath  Pulmonary - No history or complaints of shortness of breath or productive cough  Gastrointestinal - as noted above  Genitourinary - No history or complaints of hematuria/dysuria or renal lithiasis  Musculoskeletal - No history or complaints of joint  muscular weakness  Hematologic - No history of any bleeding episodes  Neurologic - No history or complaints of  migraine headaches or neurologic symptoms        Objective:     /75 (Site: Right Upper Arm, Position: Sitting, Cuff Size: Large Adult)   Pulse 74   Temp 98.4 °F (36.9 °C)   Ht 1.6 m (5' 3\")   Wt 72.7 kg (160 lb 4.8 oz)   SpO2 100%   BMI 28.40 kg/m²

## 2024-05-15 NOTE — PATIENT INSTRUCTIONS
Start antibiotics flagyl and biaxin  Increase your prilosec to twice a day when taking antibtiotics  Start the gallbladder medication (ursodiol) after finishing the antibiotics    May advance diet to solid phase.    Remember to measure portions, to keep the focus on increasing your protein & keeping your carbs low. Continue the use of protein shakes.  To follow recommendations of dietician.  Stay hydrated!  Eat regularly, eat slowly & do not drink with your meals.  Vitamins to be taken include your multivitamin, B12, calcium citrate, Vit D & Bcomplex.  Refer to your notebook & handouts for dosages.  Continue to increase cardio activity.  May add resistance exercises.  Continue follow-up with your PCP.  Return to this office in 1 month.  Call if questions/concerns prior to your office visit.

## 2024-05-15 NOTE — PROCEDURES
[facility]    Upper GI Procedure Report      Smita Multani    Medical Record Number:[id number]    1965    Date of Service - May 15, 2024    Pre-Op Diagnosis - patient is status post sleeve to gastric bypass conversion performed by this office 5 weeks ago with complaint of currently on a soft food diet with difficulty, reports fatigue, vomiting after eating and foamies. Having LLQ pain as expcted for this time frame, mainly with trying to lay on her side at night. She denies difficulty swallowing, nausea and reflux.. Taking in 60oz water daily.  Sources of protein include 2 shakes daily + eggs, chicken, shrimp. Eating 1-2 times a day. . They now present for UGI to assess their post surgical anatomy.    Post-Op Diagnosis -same    Procedure - UGI study with barium    Surgeon - Srinath Barreto MD    Assistant - None    Complications - None    Specimens - None    Implants - None    Estimate Blood Loss - None    Statement of Medical Necessity - Need for radiologic evaluation prior to further management of their care..    Procedure -the patient was brought to the fluoroscopy suite where they were given thin barium.  On swallowing the barium the patient was noted to have normal peristalsis of their esophagus with progressive flow into the distal esophagus.  Specific findings of the distal esophagus revealed that they did note have a hiatal hernia. Contrast flowed normally through the esophagus and into a properly sized gastric pouch without reflux or obstruction or signs of stricture. The pouch filled in a timely manner and emptied into the azalia limb without issue or hesitation. The anatomy was normal for the timeframe with no stricture or obstruction at the anastomosis or any other abnormality.  Given the benign findings of today's exam we will educate her on the nature of her bypass anatomy.    Srinath Barreto MD

## 2024-05-15 NOTE — PROGRESS NOTES
[facility] UGI FOCUS NOTE      Date:  5/15/2024  Time:  3:24 PM    Patient:  Smita Multani  Procedure:  UGI      Patient Questions  Lap Band Adjustment Patient Questionnaire:  If female, are you pregnant? []Yes     [x]No  Tolerates thick liquids:  []Well   []1     []2     []3     []4     []5     []Poorly  Tolerates red meat:  []Well   []1     []2     []3     []4     []5     [] Poorly  Tolerates chicken:  []Well   []1     []2     []3     []4     []5     []Poorly  Tolerates fish:   []Well   []1     []2     []3     []4     []5     []Poorly  Hunger is:   [x]Well Controlled     []1     []2     []3     []4     []5      [] Poorly Controlled  Nightime regurgitation:  [x]Never     []1     []2     []3     []4     []5     []Frequently    Lap Band Info:  Band Type  []Realize     []Realize-C     []AP     []VG     []10cm     []Unknown  []Other      Comments:        Radha Figueroa RN

## 2024-06-25 ENCOUNTER — TELEPHONE (OUTPATIENT)
Age: 59
End: 2024-06-25

## 2024-08-21 ENCOUNTER — TELEPHONE (OUTPATIENT)
Age: 59
End: 2024-08-21

## 2024-08-21 NOTE — TELEPHONE ENCOUNTER
Pt called and states her body is hurting and is having muscle cramps. Pt lives in [place] and does not have insurance. Advised pt to go to ED to be evaluated. Pt expresses understanding,

## 2025-08-29 DIAGNOSIS — K90.9 INTESTINAL MALABSORPTION, UNSPECIFIED TYPE: Primary | ICD-10-CM

## 2025-08-29 DIAGNOSIS — Z98.84 S/P GASTRIC BYPASS: ICD-10-CM

## 2025-08-29 DIAGNOSIS — K90.9 INTESTINAL MALABSORPTION, UNSPECIFIED TYPE: ICD-10-CM

## (undated) DEVICE — SOLUTION IRRIG 500ML 0.9% SOD CHLO USP POUR PLAS BTL

## (undated) DEVICE — WOUND RETRACTOR AND PROTECTOR: Brand: ALEXIS WOUND PROTECTOR-RETRACTOR

## (undated) DEVICE — GARMENT,MEDLINE,DVT,INT,CALF,MED, GEN2: Brand: MEDLINE

## (undated) DEVICE — KIT SUTURING DEVICE M-CLOSE

## (undated) DEVICE — TUBING, SUCTION, 1/4" X 12', STRAIGHT: Brand: MEDLINE

## (undated) DEVICE — RELOAD STPL L60MM H1-2.6MM MESENTERY THN TISS WHT 6 ROW

## (undated) DEVICE — SHEARS LAP L45CM DIA5MM ULTRASONIC CRV TIP ADV HEMSTAS HARM

## (undated) DEVICE — SOLUTION SURG PREP 26 CC PURPREP

## (undated) DEVICE — MASK O2 O2 LN L7FT CO2 LN L10FT FEM BG 750ML M CONC ADPT

## (undated) DEVICE — MOUTHPIECE ENDOSCP L CTRL OPN AND SIDE PORTS DISP

## (undated) DEVICE — SPONGE GZ W4XL4IN COT 12 PLY TYP VII WVN C FLD DSGN STERILE

## (undated) DEVICE — TROCAR ENDOSCP L100MM DIA15MM BLDELSS STBL SL ENDOPATH XCEL

## (undated) DEVICE — RESERVOIR,SUCTION,100CC,SILICONE: Brand: MEDLINE

## (undated) DEVICE — GLOVE ORANGE PI 8   MSG9080

## (undated) DEVICE — CUTTER ENDOSCP L340MM LIN ARTC SGL STROKE FIRING ENDOPATH

## (undated) DEVICE — STRIP SKIN CLSR W1XL5IN NYLON REINF CURAD STERIL

## (undated) DEVICE — APPLIER SUT CLP FOR 2-0 3-0 4-0 VCRL ABSRB SUT

## (undated) DEVICE — DRAIN SURG 15FR SIL RND CHN W/ TRCR FULL FLUT DBL WRP TRAD

## (undated) DEVICE — Z DISCONTINUED USE 2218136 SUTURE ETHILON SZ 3-0 L30IN NONABSORBABLE BLK FSL L30MM 3/8 1671H

## (undated) DEVICE — Device

## (undated) DEVICE — 1LYRTR 16FR10ML100%SIL UMS SNP: Brand: MEDLINE INDUSTRIES, INC.

## (undated) DEVICE — SUTURE PROL SZ 2-0 L30IN NONABSORBABLE BLU L26MM CT-2 1/2 8411H

## (undated) DEVICE — RELOAD STPL H1-2.5X45MM VASC THN TISS WHT 6 ROW B FRM SGL

## (undated) DEVICE — FORCEPS BX OVL CUP SERR DISP CAP L 240CM RAD JAW 4

## (undated) DEVICE — SUTURE PDS II SZ 0 L27IN ABSRB VLT L26MM CT-2 1/2 CIR Z334H

## (undated) DEVICE — SLEEVE TRCR L100MM DIA5MM UNIV STBL FOR BLDELSS DIL TIP

## (undated) DEVICE — APPLIER CLP L SHFT DIA12MM 20 ROT MULT LIGACLP

## (undated) DEVICE — TROCAR LAP L100MM DIA5MM BLDELSS W/ STBL SL ENDOPATH XCEL

## (undated) DEVICE — ENDOSCOPY PUMP TUBING/ CAP SET: Brand: ERBE

## (undated) DEVICE — E-Z CLEAN, PTFE COATED, ELECTROSURGICAL LAPAROSCOPIC ELECTRODE, L-HOOK, 33 CM., SINGLE-USE, FOR USE WITH HAND CONTROL PENCIL: Brand: MEGADYNE

## (undated) DEVICE — STAPLER 60MM POWERED ECHELON 3000 LONG 440MM

## (undated) DEVICE — APPLICATOR LAP 35 CM 2 RIGID VISTASEAL

## (undated) DEVICE — RELOAD STPL L60MM H2.3-4.2MM VERY THCK TISS BLK 6 ROW

## (undated) DEVICE — BARIATRIC: Brand: MEDLINE INDUSTRIES, INC.

## (undated) DEVICE — TROCAR ENDOSCP L100MM DIA12MM STBL SL BLDELSS ENDOPATH XCEL

## (undated) DEVICE — SET TBNG DISP TIP FOR AHTO

## (undated) DEVICE — SUTURE NONABSORBABLE MONOFILAMENT 3-0 PS-1 18 IN BLK ETHILON 1663H

## (undated) DEVICE — GLOVE ORANGE PI 7 1/2   MSG9075

## (undated) DEVICE — SOLUTION IRRIG 3000ML LAC RINGERS ARTHROMTC PLAS CONT

## (undated) DEVICE — TROCAR ENDOSCP BLDELSS 12X100 MM W/ HNDL STBL SL OPT TIP

## (undated) DEVICE — SUTURE ETHIBOND EXCL BR GRN TAPR PT 2-0 30 X563H X563H

## (undated) DEVICE — SOLUTION ANTIFOG VIS SYS CLEARIFY LAPSCP

## (undated) DEVICE — SOLUTION IV LACTATED RINGERS INJECTION USP

## (undated) DEVICE — STAPLER INT L37CM STPL 21MM CIR ENDOSCP CRV INTLUMN B FRM

## (undated) DEVICE — SUTURE MONOCRYL + SZ 4-0 L27IN ABSRB UD L19MM PS-2 3/8 CIR MCP426H

## (undated) DEVICE — SCISSOR SURG CRV ENDOCUT TIP FOR LAP DISP

## (undated) DEVICE — AGENT HEMSTAT W6XL9IN OXIDIZED REGENERATED CELOS ABSRB FOR

## (undated) DEVICE — TISSUE RETRIEVAL SYSTEM: Brand: INZII RETRIEVAL SYSTEM

## (undated) DEVICE — ELECTRODE PT RET AD L9FT HI MOIST COND ADH HYDRGEL CORDED

## (undated) DEVICE — SUTURE VICRYL SZ 2-0 L27IN ABSRB VLT L26MM SH 1/2 CIR J317H

## (undated) DEVICE — SEALANT TISS 10 CC FOR HUM FIBRIN VISTASEAL